# Patient Record
Sex: FEMALE | Race: WHITE | HISPANIC OR LATINO | Employment: OTHER | URBAN - METROPOLITAN AREA
[De-identification: names, ages, dates, MRNs, and addresses within clinical notes are randomized per-mention and may not be internally consistent; named-entity substitution may affect disease eponyms.]

---

## 2018-12-18 ENCOUNTER — OFFICE VISIT (OUTPATIENT)
Dept: FAMILY MEDICINE | Facility: HOSPITAL | Age: 23
End: 2018-12-18
Attending: SPECIALIST
Payer: MEDICAID

## 2018-12-18 ENCOUNTER — DOCUMENTATION ONLY (OUTPATIENT)
Dept: FAMILY MEDICINE | Facility: HOSPITAL | Age: 23
End: 2018-12-18

## 2018-12-18 VITALS
DIASTOLIC BLOOD PRESSURE: 59 MMHG | SYSTOLIC BLOOD PRESSURE: 111 MMHG | HEIGHT: 62 IN | HEART RATE: 79 BPM | WEIGHT: 150.38 LBS | BODY MASS INDEX: 27.67 KG/M2

## 2018-12-18 DIAGNOSIS — Z34.00 ENCOUNTER FOR SUPERVISION PREGNANCY IN PRIMIGRAVIDA, ANTEPARTUM: ICD-10-CM

## 2018-12-18 DIAGNOSIS — Z23 NEED FOR PROPHYLACTIC VACCINATION AND INOCULATION AGAINST INFLUENZA: Primary | ICD-10-CM

## 2018-12-18 LAB
BILIRUB SERPL-MCNC: NEGATIVE MG/DL
BLOOD URINE, POC: NEGATIVE
COLOR, POC UA: NORMAL
GLUCOSE UR QL STRIP: NORMAL
KETONES UR QL STRIP: NEGATIVE
LEUKOCYTE ESTERASE URINE, POC: NEGATIVE
NITRITE, POC UA: NEGATIVE
PH, POC UA: 6
PROTEIN, POC: NEGATIVE
SPECIFIC GRAVITY, POC UA: 1.02
UROBILINOGEN, POC UA: NORMAL

## 2018-12-18 PROCEDURE — 81001 URINALYSIS AUTO W/SCOPE: CPT | Performed by: STUDENT IN AN ORGANIZED HEALTH CARE EDUCATION/TRAINING PROGRAM

## 2018-12-18 PROCEDURE — 99203 OFFICE O/P NEW LOW 30 MIN: CPT | Mod: 25 | Performed by: STUDENT IN AN ORGANIZED HEALTH CARE EDUCATION/TRAINING PROGRAM

## 2018-12-18 PROCEDURE — 90686 IIV4 VACC NO PRSV 0.5 ML IM: CPT

## 2018-12-18 RX ORDER — PROMETHAZINE HYDROCHLORIDE 25 MG/1
SUPPOSITORY RECTAL
Refills: 2 | COMMUNITY
Start: 2018-10-25 | End: 2019-04-03

## 2018-12-18 RX ORDER — PNV NO.95/FERROUS FUM/FOLIC AC 28MG-0.8MG
1 TABLET ORAL DAILY
Refills: 0 | COMMUNITY
Start: 2018-10-22 | End: 2019-04-17 | Stop reason: SDUPTHER

## 2018-12-18 RX ORDER — PROMETHAZINE HYDROCHLORIDE 25 MG/1
TABLET ORAL
Refills: 0 | COMMUNITY
Start: 2018-10-22 | End: 2019-03-30

## 2018-12-18 NOTE — PROGRESS NOTES
Subjective:       Patient ID: IAIN GERONIMO is a 23 y.o. female.    Chief Complaint:  Routine Prenatal Visit      History of Present Illness  23 year old  presents to clinic to establish prenatal care at 15 weeks 4 days by last menstrual period. Her PATY is . She went to [facility], but states she did not like the care she received there and would like to follow in our clinic. Her first pregnancy was uncomplicated with good prenatal care. She was 17 years old during that pregnancy and delivered a full term baby with a weight of about 8 lbs via . Her current pregnancy has been complicated by first trimester nausea and vomiting that has since resolved. Otherwise her pregnancy has been uncomplicated. She denies bleeding or discharge vaginally. She has not felt the baby yet, and has no other complaints at this time.     No Past Medical History  No Past Surgical History  No known Allergies  Currently takes Prenatal vitamins daily     GYN & OB History  Patient's last menstrual period was 2018 (exact date).   Date of Last Pap: No result found    OB History    Para Term  AB Living   2             SAB TAB Ectopic Multiple Live Births                  # Outcome Date GA Lbr Sharif/2nd Weight Sex Delivery Anes PTL Lv   1   40W  8 lbs  M              Review of Systems  Review of Systems   Constitutional: Negative for activity change, appetite change, chills, diaphoresis, fatigue, fever and unexpected weight change.   HENT: Negative for nasal congestion.    Eyes: Negative for discharge and visual disturbance.   Respiratory: Negative for cough, shortness of breath and wheezing.    Cardiovascular: Negative for chest pain and palpitations.   Gastrointestinal: Positive for bloating and nausea. Negative for abdominal pain, blood in stool, constipation, diarrhea, vomiting, reflux and fecal incontinence.   Genitourinary: Negative for bladder incontinence, dyspareunia,  flank pain, frequency, genital sores, hematuria, pelvic pain, urgency, vaginal bleeding, vaginal discharge, dysmenorrhea and vaginal odor.   Musculoskeletal: Negative for back pain, joint swelling and myalgias.   Neurological: Negative for headaches.   Psychiatric/Behavioral: Negative for depression. The patient is not nervous/anxious.            Objective:  Vitals:    12/18/18 1036   BP: (!) 111/59   Pulse: 79   Fetal heart rate: 144      Physical Exam:   Constitutional: She is oriented to person, place, and time. She appears well-developed and well-nourished. No distress.    HENT:   Head: Normocephalic and atraumatic.   Nose: Nose normal. No epistaxis.   Mouth/Throat: Oropharynx is clear and moist. No oropharyngeal exudate.    Eyes: Conjunctivae and EOM are normal. Pupils are equal, round, and reactive to light.    Neck: Normal range of motion. Neck supple. No thyromegaly present.    Cardiovascular: Normal rate, regular rhythm and normal heart sounds.    No murmur heard. Pulse Score: 2+   Pulmonary/Chest: Effort normal and breath sounds normal. No respiratory distress. She has no wheezes. She has no rales. She exhibits no tenderness.        Abdominal: Soft. Bowel sounds are normal. She exhibits no distension. There is no tenderness.   Uterus is 5 cm below umbilicus                  Neurological: She is alert and oriented to person, place, and time.    Skin: Skin is warm and dry. No rash noted. She is not diaphoretic.    Psychiatric: She has a normal mood and affect. Her behavior is normal.          Assessment:        1. Need for prophylactic vaccination and inoculation against influenza    2. Encounter for supervision pregnancy in primigravida, antepartum               Plan:      Routine Prenatal care:  -Continue routine prenatal care  -Urinalysis and urine protein results pending  -Fetal heart tones are within normal limits   -Faxed release of information to [facility] for medical records  -Will  follow up with prenatal labs  -Counseled patient on eating a health diet and staying hydrated   -If she has any further concerns please call the office    -Return to Clinic in 1 month for routine check up and fetal ultrasound

## 2019-02-06 ENCOUNTER — ROUTINE PRENATAL (OUTPATIENT)
Dept: FAMILY MEDICINE | Facility: HOSPITAL | Age: 24
End: 2019-02-06
Attending: FAMILY MEDICINE
Payer: MEDICAID

## 2019-02-06 VITALS
HEART RATE: 99 BPM | DIASTOLIC BLOOD PRESSURE: 71 MMHG | WEIGHT: 157.88 LBS | SYSTOLIC BLOOD PRESSURE: 117 MMHG | BODY MASS INDEX: 28.87 KG/M2

## 2019-02-06 DIAGNOSIS — Z34.90 INTRAUTERINE PREGNANCY: Primary | ICD-10-CM

## 2019-02-06 DIAGNOSIS — Z3A.22 22 WEEKS GESTATION OF PREGNANCY: ICD-10-CM

## 2019-02-06 PROCEDURE — 99213 OFFICE O/P EST LOW 20 MIN: CPT | Performed by: STUDENT IN AN ORGANIZED HEALTH CARE EDUCATION/TRAINING PROGRAM

## 2019-02-07 ENCOUNTER — LAB VISIT (OUTPATIENT)
Dept: LAB | Facility: HOSPITAL | Age: 24
End: 2019-02-07
Attending: STUDENT IN AN ORGANIZED HEALTH CARE EDUCATION/TRAINING PROGRAM
Payer: MEDICAID

## 2019-02-07 DIAGNOSIS — Z3A.22 PREGNANCY WITH 22 COMPLETED WEEKS GESTATION: ICD-10-CM

## 2019-02-07 LAB
BACTERIA #/AREA URNS HPF: ABNORMAL /HPF
BILIRUB UR QL STRIP: NEGATIVE
CLARITY UR: CLEAR
COLOR UR: YELLOW
GLUCOSE UR QL STRIP: NEGATIVE
HGB UR QL STRIP: NEGATIVE
KETONES UR QL STRIP: NEGATIVE
LEUKOCYTE ESTERASE UR QL STRIP: ABNORMAL
MICROSCOPIC COMMENT: ABNORMAL
NITRITE UR QL STRIP: NEGATIVE
PH UR STRIP: 6 [PH] (ref 5–8)
PROT UR QL STRIP: NEGATIVE
SP GR UR STRIP: 1.01 (ref 1–1.03)
SQUAMOUS #/AREA URNS HPF: 2 /HPF
URN SPEC COLLECT METH UR: ABNORMAL
UROBILINOGEN UR STRIP-ACNC: NEGATIVE EU/DL
WBC #/AREA URNS HPF: 6 /HPF (ref 0–5)

## 2019-02-07 PROCEDURE — 87086 URINE CULTURE/COLONY COUNT: CPT

## 2019-02-07 PROCEDURE — 81000 URINALYSIS NONAUTO W/SCOPE: CPT

## 2019-02-07 NOTE — PROGRESS NOTES
IAIN GERONIMO  presents for her 22 week OB visit.  Patient's last menstrual period was 2018 (exact date). She denies any vaginal bleeding and/or discharge. She denies any edema or elevated blood pressures.    No past medical history on file.  No past surgical history on file.  No family history on file.  Review of patient's allergies indicates:  No Known Allergies  Social History     Socioeconomic History    Marital status: Single     Spouse name: Not on file    Number of children: Not on file    Years of education: Not on file    Highest education level: Not on file   Social Needs    Financial resource strain: Not on file    Food insecurity - worry: Not on file    Food insecurity - inability: Not on file    Transportation needs - medical: Not on file    Transportation needs - non-medical: Not on file   Occupational History    Not on file   Tobacco Use    Smoking status: Never Smoker   Substance and Sexual Activity    Alcohol use: No     Frequency: Never    Drug use: Not on file    Sexual activity: Not on file   Other Topics Concern    Not on file   Social History Narrative    Not on file       ROS:  GENERAL: No fever, chills, fatigability or weight loss.  VULVAR: No pain, no lesions and no itching.  VAGINAL: No relaxation, no itching, no discharge, no abnormal bleeding and no lesions.  ABDOMEN: No abdominal pain. Denies nausea. Denies vomiting. No diarrhea. No constipation  BREAST: Denies pain. No lumps. No discharge.  URINARY: No incontinence, no nocturia, no frequency and no dysuria.  CARDIOVASCULAR: No chest pain. No shortness of breath. No leg cramps.  NEUROLOGICAL: no headaches. No vision changes.      Vitals:    19 1636   BP: 117/71   Pulse: 99     GENERAL: healthy  CV: RRR  Pulm: CTAB  Fetal Heart tones: 154.     A/P:    IAIN was seen today for routine prenatal visit.     She endorsed that she would obtain her Pap results along with GC results from [facility]; however, if our  team does not obtain this information then she may need a repeat pap w/ STI testing.     Pregnancy with IUP and currently at 22 completed weeks gestation  -     Urinalysis; Future  -     Urine culture; Future  -     Type & Screen, Labor & Delivery; Future  -     Glucose tolerance, 3 hours; Future at 26 weeks gestation  -     CBC auto differential; Future  -     Hepatitis B surface antigen; Future  -     TREPONEMA PALLIDUM (SYPHILLIS) ANTIBODY; Future  -     Rubella antibody, IgG; Future  -     HIV 1/2 Ag/Ab (4th Gen); Future  -     US OB More Than 14 Wks with Biophysical; Future        Counseled to avoid cat litter, not garden without gloves, avoid raw meat, heat up deli meat, to eat large fish like tuna no more than once a week, and to avoid soft unpasteurized cheeses.  I recommend a PNV daily.  She should avoid ibuprofen.

## 2019-02-08 ENCOUNTER — HOSPITAL ENCOUNTER (OUTPATIENT)
Dept: RADIOLOGY | Facility: HOSPITAL | Age: 24
Discharge: HOME | End: 2019-02-08
Attending: STUDENT IN AN ORGANIZED HEALTH CARE EDUCATION/TRAINING PROGRAM
Payer: MEDICAID

## 2019-02-08 DIAGNOSIS — Z3A.22 PREGNANCY WITH 22 COMPLETED WEEKS GESTATION: ICD-10-CM

## 2019-02-08 LAB — BACTERIA UR CULT: NORMAL

## 2019-02-08 PROCEDURE — 76805 OB US >/= 14 WKS SNGL FETUS: CPT | Mod: TC

## 2019-02-08 PROCEDURE — 76805 OB US >/= 14 WKS SNGL FETUS: CPT | Mod: 26,,, | Performed by: RADIOLOGY

## 2019-02-08 PROCEDURE — 76805 US OB GREATER THAN 14 WEEKS FIRST GESTATION: ICD-10-PCS | Mod: 26,,, | Performed by: RADIOLOGY

## 2019-02-18 ENCOUNTER — DOCUMENTATION ONLY (OUTPATIENT)
Dept: FAMILY MEDICINE | Facility: HOSPITAL | Age: 24
End: 2019-02-18

## 2019-02-18 DIAGNOSIS — Z34.92 PREGNANT AND NOT YET DELIVERED IN SECOND TRIMESTER: Primary | ICD-10-CM

## 2019-02-18 NOTE — PROGRESS NOTES
I called and updated the patient with her results. In addition, I informed her that I placed an order for FTA-ABS since her initial test was equivocal. I encouraged her to maintain her appointment with Dr. Lewis on 3/8/19. I encouraged her to call the clinic with any additional questions or concerns.

## 2019-02-19 ENCOUNTER — LAB VISIT (OUTPATIENT)
Dept: LAB | Facility: HOSPITAL | Age: 24
End: 2019-02-19
Attending: STUDENT IN AN ORGANIZED HEALTH CARE EDUCATION/TRAINING PROGRAM
Payer: MEDICAID

## 2019-02-19 DIAGNOSIS — Z34.92 PREGNANT AND NOT YET DELIVERED IN SECOND TRIMESTER: ICD-10-CM

## 2019-02-19 PROCEDURE — 86780 TREPONEMA PALLIDUM: CPT

## 2019-02-19 PROCEDURE — 36415 COLL VENOUS BLD VENIPUNCTURE: CPT

## 2019-02-20 LAB — T PALLIDUM AB SER QL IF: NORMAL

## 2019-03-08 ENCOUNTER — ROUTINE PRENATAL (OUTPATIENT)
Dept: FAMILY MEDICINE | Facility: HOSPITAL | Age: 24
End: 2019-03-08
Attending: FAMILY MEDICINE
Payer: MEDICAID

## 2019-03-08 VITALS
DIASTOLIC BLOOD PRESSURE: 62 MMHG | WEIGHT: 161.19 LBS | BODY MASS INDEX: 29.48 KG/M2 | SYSTOLIC BLOOD PRESSURE: 107 MMHG | HEART RATE: 84 BPM

## 2019-03-08 DIAGNOSIS — Z34.92 PRENATAL CARE IN SECOND TRIMESTER: Primary | ICD-10-CM

## 2019-03-08 PROCEDURE — 99213 OFFICE O/P EST LOW 20 MIN: CPT | Performed by: STUDENT IN AN ORGANIZED HEALTH CARE EDUCATION/TRAINING PROGRAM

## 2019-03-08 PROCEDURE — 81001 URINALYSIS AUTO W/SCOPE: CPT | Performed by: STUDENT IN AN ORGANIZED HEALTH CARE EDUCATION/TRAINING PROGRAM

## 2019-03-08 NOTE — PROGRESS NOTES
Subjective:       Patient ID: MICHEAL GERONIMO is a 23 y.o. female.    Chief Complaint:  Routine Prenatal Visit      History of Present Illness  Micheal is a 22 yo female, , 26 wks consistent with LMP and first trimester ultrasound. Patient is doing well, +fetal movement, no LOF or vaginal bleeding, and no complaints of dysuria.  Fundal height consistent with GA.  Fetal tones reassuring, 144 bpm.   Patient inquired about feeling diffuse pelvic pressure and vaginal heaviness.    Last visit showed +4 leukocytes on UA, f/u UA today negative for infection.    At last visit, pt slightly anemia at 11.4/35.3 (Hgb/Hct), repeat blood today.    Ordered RPR for syphilis today.       GYN & OB History  Patient's last menstrual period was 2018 (exact date).   Date of Last Pap: No result found    OB History    Para Term  AB Living   1             SAB TAB Ectopic Multiple Live Births                  # Outcome Date GA Lbr Sharif/2nd Weight Sex Delivery Anes PTL Lv   1 Current                   Review of Systems  Review of Systems   Constitutional: Negative.  Negative for appetite change, chills, fatigue and fever.   HENT: Negative for nasal congestion, mouth sores and tinnitus.         Complains of dryness due to colder weather, leading to minor nose bleeding.   Eyes: Negative.  Negative for visual disturbance.   Respiratory: Negative.  Negative for cough, shortness of breath and wheezing.    Cardiovascular: Negative.  Negative for chest pain, palpitations and leg swelling.   Gastrointestinal: Negative.  Negative for abdominal pain, bloating, blood in stool, constipation, diarrhea, nausea, vomiting, reflux and fecal incontinence.   Endocrine: Negative for diabetes and hair loss.   Genitourinary: Negative.  Negative for bladder incontinence, vaginal bleeding and urinary incontinence.   Musculoskeletal: Negative.  Negative for back pain and joint swelling.   Integumentary:  Negative for rash. Negative.    Neurological: Negative.  Negative for numbness and headaches.   Hematological: Does not bruise/bleed easily.   Psychiatric/Behavioral: Negative for depression. The patient is not nervous/anxious.            Objective:    Physical Exam:   Constitutional: She appears well-developed and well-nourished. No distress.    HENT:   Head: Normocephalic.   Right Ear: External ear normal.   Left Ear: External ear normal.   Nose: Nose normal.   Mouth/Throat: Oropharynx is clear and moist. No oropharyngeal exudate.    Eyes: Conjunctivae and EOM are normal. Pupils are equal, round, and reactive to light. Right eye exhibits no discharge. Left eye exhibits no discharge. No scleral icterus.    Neck: Normal range of motion. Neck supple. No thyromegaly present.    Cardiovascular: Normal rate, regular rhythm and normal heart sounds.  Exam reveals no edema.     Pulmonary/Chest: Effort normal and breath sounds normal.        Abdominal: Soft.             Musculoskeletal: Normal range of motion.      Lymphadenopathy:     She has no cervical adenopathy.    Neurological: She is alert.    Skin: Skin is warm and dry. No pallor.    Psychiatric: She has a normal mood and affect. Her behavior is normal.          Assessment:        1. Prenatal care in second trimester               Plan:      Blood work: RPR   Urinalysis: negative for infection, leukocytes or bacteria   Fetal heart tones within normal limits   Prenatal labs completed and within normal limits, no gestational diabetes  Fundal height appropriate for gestational age  Continue routine prenatal care  Fetal anatomy scan within normal limits, healthy pregnancy   Encouraged to continue taking daily multivitamin  Stay hydrated  Return to clinic in 4 weeks for follow up

## 2019-03-30 ENCOUNTER — HOSPITAL ENCOUNTER (EMERGENCY)
Facility: HOSPITAL | Age: 24
Discharge: HOME | End: 2019-03-30
Attending: EMERGENCY MEDICINE
Payer: MEDICAID

## 2019-03-30 VITALS
HEART RATE: 90 BPM | SYSTOLIC BLOOD PRESSURE: 108 MMHG | HEIGHT: 62 IN | TEMPERATURE: 98 F | BODY MASS INDEX: 30 KG/M2 | DIASTOLIC BLOOD PRESSURE: 61 MMHG | OXYGEN SATURATION: 99 % | WEIGHT: 163 LBS | RESPIRATION RATE: 16 BRPM

## 2019-03-30 DIAGNOSIS — Z3A.30 30 WEEKS GESTATION OF PREGNANCY: Primary | ICD-10-CM
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
## 2019-03-30 DIAGNOSIS — S90.32XA CONTUSION OF LEFT FOOT, INITIAL ENCOUNTER: ICD-10-CM

## 2019-03-30 PROCEDURE — 99284 EMERGENCY DEPT VISIT MOD MDM: CPT

## 2019-03-30 NOTE — ED NOTES
Patient presents to ED with c/o Fall. Patient states she fell at home about an hour ago. States she has some mild lower back pain and left foot pain 4/10. Patient is 30 weeks pregnant. She denies hitting her abdomen.     APPEARANCE: Alert, oriented and in no acute distress.  CARDIAC: Normal rate and rhythm, no murmur heard.   PERIPHERAL VASCULAR: peripheral pulses present. Normal cap refill. No edema. Warm to touch.    RESPIRATORY:Normal rate and effort, breath sounds clear bilaterally throughout chest. Respirations are equal and unlabored no obvious signs of distress.  GASTRO: bowel sounds normal, 30 weeks pregnant..  MUSC: Full ROM. No bony tenderness or soft tissue tenderness. No obvious deformity.  SKIN: Skin is warm and dry, normal skin turgor, mucous membranes moist.  NEURO: 5/5 strength major flexors/extensors bilaterally. Sensory intact to light touch bilaterally. Estrella coma scale: eyes open spontaneously-4, oriented & converses-5, obeys commands-6. No neurological abnormalities.   MENTAL STATUS: awake, alert and aware of environment.  EYE: PERRL, both eyes: pupils brisk and reactive to light. Normal size.  ENT: EARS: no obvious drainage. NOSE: no active bleeding.

## 2019-03-30 NOTE — ED PROVIDER NOTES
Encounter Date: 3/30/2019    SCRIBE #1 NOTE: I, Malissa Hammer, am scribing for, and in the presence of,  Dr. Ramos. I have scribed the entire note.       History     Chief Complaint   Patient presents with    Fall     Fell doen a flight of stairs and complaints of lower back pain and left top of foot.  No vaginal bleeding, and no abdominal pain.  No LOC.  Patient is 30 weeks pregnant.      IAIN GERONIMO is a 23 y.o. female who  has no past medical history on file.    The patient is 30 weeks pregnat and presents to the ED due to lower back and left foot pain secondary to a fall. Patient states that she fell down 5 stairs and landed on her back. Patient denies hitting her head or any LOC. No abdominal pain or vaginal bleeding.                The history is provided by the patient.     Review of patient's allergies indicates:  No Known Allergies  History reviewed. No pertinent past medical history.  History reviewed. No pertinent surgical history.  History reviewed. No pertinent family history.  Social History     Tobacco Use    Smoking status: Never Smoker   Substance Use Topics    Alcohol use: No     Frequency: Never    Drug use: Not on file     Review of Systems   Gastrointestinal: Negative for abdominal pain.   Genitourinary: Negative for vaginal bleeding.   Musculoskeletal: Positive for arthralgias (left foot) and back pain (lower back pain).   All other systems reviewed and are negative.      Physical Exam     Initial Vitals [03/30/19 1007]   BP Pulse Resp Temp SpO2   108/61 90 16 98 °F (36.7 °C) 99 %      MAP       --         Physical Exam    Nursing note and vitals reviewed.  Constitutional: She appears well-developed and well-nourished. She is not diaphoretic. No distress.   HENT:   Head: Normocephalic and atraumatic.   Mouth/Throat: Oropharynx is clear and moist.   Eyes: Conjunctivae and EOM are normal. Pupils are equal, round, and reactive to light.   Neck: Normal range of motion. Neck supple.    Cardiovascular: Normal rate, regular rhythm and normal heart sounds. Exam reveals no gallop and no friction rub.    No murmur heard.  Pulmonary/Chest: Breath sounds normal. She has no wheezes. She has no rhonchi. She has no rales.   Abdominal: Soft. Bowel sounds are normal. There is no tenderness. There is no rebound and no guarding.   Gravid.  No tenderness.    Musculoskeletal: Normal range of motion. She exhibits tenderness. She exhibits no edema.   No cervical vertebral tenderness.  Mild tenderness of lower lumbar vertebrate.  Top of left foot with tenderness over the mid first metatarsal.  No swelling or bruising.  Remainder of extremity exam unremarkable.    Lymphadenopathy:     She has no cervical adenopathy.   Neurological: She is alert and oriented to person, place, and time. She has normal strength.   Skin: Skin is warm and dry. Capillary refill takes less than 2 seconds. No rash noted.         ED Course   Procedures  Labs Reviewed - No data to display       Imaging Results          X-Ray Foot Complete Left (Final result)  Result time 03/30/19 10:55:09    Final result by Guzman Santos MD (03/30/19 10:55:09)                 Impression:      As above.      Electronically signed by: Guzman Santos MD  Date:    03/30/2019  Time:    10:55             Narrative:    EXAMINATION:  XR FOOT COMPLETE 3 VIEW LEFT    CLINICAL HISTORY:  .  Unspecified fall, initial encounter    TECHNIQUE:  AP, lateral and oblique views of the left foot were performed.    COMPARISON:  None    FINDINGS:  No fracture or dislocation.  Lisfranc articulation is congruent.  Cartilage spaces are maintained.  Soft tissues are unremarkable.                                 Medical Decision Making:   Clinical Tests:   Radiological Study: Ordered and Reviewed  ED Management:  23-year-old female who is 30 weeks pregnant and fell down stairs this morning.  Patient complained of lower back pain left foot pain.  She has had no abdominal pain or  vaginal bleeding.  Fetal heart tones were obtained at 1:46 a.m..  Abdomen was shielded and a left foot x-ray was done showing no fracture or dislocation.  I do not suspect placental abruption.  Findings were discussed with the [facility] resident, (she is a patient of theirs).  She will follow up in clinic as soon as able for recheck but also return here immediately for any abdominal pain or vaginal bleeding.                      Clinical Impression:     1. 30 weeks gestation of pregnancy    2. Fall    3. Contusion of left foot, initial encounter              I, Dr. Jose Ramos, personally performed the services described in this documentation. All medical record entries made by the scribe were at my direction and in my presence. I have reviewed the chart and agree that the record reflects my personal performance and is accurate and complete. Jose Ramos MD.  11:52 AM 03/30/2019                      Jose Ramos MD  03/30/19 1830

## 2019-04-03 ENCOUNTER — ROUTINE PRENATAL (OUTPATIENT)
Dept: FAMILY MEDICINE | Facility: HOSPITAL | Age: 24
End: 2019-04-03
Attending: FAMILY MEDICINE
Payer: MEDICAID

## 2019-04-03 VITALS
DIASTOLIC BLOOD PRESSURE: 65 MMHG | SYSTOLIC BLOOD PRESSURE: 112 MMHG | WEIGHT: 169.31 LBS | BODY MASS INDEX: 30.97 KG/M2 | HEART RATE: 93 BPM

## 2019-04-03 DIAGNOSIS — N30.00 ACUTE CYSTITIS WITHOUT HEMATURIA: ICD-10-CM

## 2019-04-03 DIAGNOSIS — Z3A.30 30 WEEKS GESTATION OF PREGNANCY: Primary | ICD-10-CM

## 2019-04-03 PROCEDURE — 99213 OFFICE O/P EST LOW 20 MIN: CPT | Performed by: STUDENT IN AN ORGANIZED HEALTH CARE EDUCATION/TRAINING PROGRAM

## 2019-04-03 NOTE — PROGRESS NOTES
Subjective:       Patient ID: IAIN GERONIMO is a 23 y.o. female.    Chief Complaint:  Routine Prenatal Visit      History of Present Illness  Ms. Geronimo is a 23 year old  female at 30 weeks gestation who presents today for routine prenatal visit. She reports a recent fall that occurred about 4 days ago. She fell down about 5 steps and landed on her back so she presented to the ED. Denies trauma to her belly. She experienced low back and left foot pain at the time, which has since resolved. Denies any belly pain or vaginal bleeding. Reports feeling safe at home. Continues to feel baby move and denies decreased movement. Fundal height measures 31 cm. Fetal heart tones detected around 150 bpm.       GYN & OB History  Patient's last menstrual period was 2018 (exact date).   Date of Last Pap: No result found    OB History    Para Term  AB Living   1             SAB TAB Ectopic Multiple Live Births                  # Outcome Date GA Lbr Sharif/2nd Weight Sex Delivery Anes PTL Lv   1 Current                Review of Systems  Review of Systems   Constitutional: Negative.  Negative for activity change, appetite change, fatigue and fever.   HENT: Negative.  Negative for nasal congestion.    Eyes: Negative.  Negative for visual disturbance.   Respiratory: Negative.  Negative for cough, shortness of breath and wheezing.    Cardiovascular: Negative.  Negative for chest pain and leg swelling.   Gastrointestinal: Negative.  Negative for abdominal pain, bloating, blood in stool, constipation, diarrhea, nausea and vomiting.   Endocrine: Negative.  Negative for diabetes and hair loss.   Genitourinary: Negative.  Negative for bladder incontinence, dyspareunia, dysuria, flank pain, hematuria, pelvic pain, urgency and vaginal bleeding.   Musculoskeletal: Positive for back pain. Negative for arthralgias, joint swelling, leg pain and myalgias.   Integumentary:  Negative for rash, breast skin changes and  breast tenderness. Negative.   Neurological: Negative.  Negative for headaches.   Hematological: Negative.  Does not bruise/bleed easily.   Psychiatric/Behavioral: Negative.  Negative for depression. The patient is not nervous/anxious.    Breast: Negative for skin changes and tenderness          Objective:    Physical Exam:   Constitutional: She is oriented to person, place, and time. She appears well-developed and well-nourished. No distress.    HENT:   Head: Normocephalic and atraumatic.   Right Ear: External ear normal.   Left Ear: External ear normal.   Nose: No epistaxis.   Mouth/Throat: Oropharynx is clear and moist.    Eyes: Pupils are equal, round, and reactive to light. Conjunctivae and EOM are normal.    Neck: Normal range of motion. Neck supple.    Cardiovascular: Normal rate, regular rhythm, normal heart sounds and intact distal pulses.    No murmur heard. Pulse Score: 2+   Pulmonary/Chest: Effort normal and breath sounds normal.        Abdominal: Soft. Bowel sounds are normal. She exhibits distension (gravid). She exhibits no abdominal incision. There is no tenderness.             Musculoskeletal: Normal range of motion and moves all extremeties.       Neurological: She is alert and oriented to person, place, and time.    Skin: Skin is warm and dry. She is not diaphoretic.    Psychiatric: She has a normal mood and affect. Her behavior is normal. Judgment and thought content normal.          Assessment:        1. 30 weeks gestation of pregnancy               Plan:       Routine prenatal care :  Urinalysis: 2+ leukocytes   Will send for urine culture   Fundal height appropriate for gestational age   Continue routine prenatal care   She desires tubaligation after birth   Counseled patient on options for birth control, but she reaffirmed the desire to have permanent sterilization after delivery     Will perform 3rd trimester blood   RTC in 2 weeks

## 2019-04-10 NOTE — PROGRESS NOTES
I have reviewed the notes, assessments, and/or procedures performed, I concur with her/his documentation of IAIN GERONIMO.

## 2019-04-17 ENCOUNTER — ROUTINE PRENATAL (OUTPATIENT)
Dept: FAMILY MEDICINE | Facility: HOSPITAL | Age: 24
End: 2019-04-17
Payer: MEDICAID

## 2019-04-17 VITALS
SYSTOLIC BLOOD PRESSURE: 112 MMHG | DIASTOLIC BLOOD PRESSURE: 71 MMHG | WEIGHT: 170.63 LBS | BODY MASS INDEX: 31.21 KG/M2

## 2019-04-17 DIAGNOSIS — Z34.93 PRENATAL CARE IN THIRD TRIMESTER: Primary | ICD-10-CM

## 2019-04-17 DIAGNOSIS — Z34.90 INTRAUTERINE PREGNANCY: ICD-10-CM

## 2019-04-17 DIAGNOSIS — Z34.93 PRENATAL CARE IN THIRD TRIMESTER: ICD-10-CM

## 2019-04-17 DIAGNOSIS — Z3A.32 32 WEEKS GESTATION OF PREGNANCY: Primary | ICD-10-CM

## 2019-04-17 LAB
BILIRUB SERPL-MCNC: ABNORMAL MG/DL
BLOOD URINE, POC: ABNORMAL
COLOR, POC UA: YELLOW
GLUCOSE UR QL STRIP: ABNORMAL
KETONES UR QL STRIP: ABNORMAL
LEUKOCYTE ESTERASE URINE, POC: ABNORMAL
NITRITE, POC UA: ABNORMAL
PH, POC UA: 6
PROTEIN, POC: ABNORMAL
SPECIFIC GRAVITY, POC UA: 1.02
UROBILINOGEN, POC UA: ABNORMAL

## 2019-04-17 PROCEDURE — 90471 IMMUNIZATION ADMIN: CPT

## 2019-04-17 PROCEDURE — 99213 OFFICE O/P EST LOW 20 MIN: CPT | Mod: 25 | Performed by: STUDENT IN AN ORGANIZED HEALTH CARE EDUCATION/TRAINING PROGRAM

## 2019-04-17 RX ORDER — PNV NO.95/FERROUS FUM/FOLIC AC 28MG-0.8MG
1 TABLET ORAL DAILY
Qty: 90 TABLET | Refills: 4 | COMMUNITY
Start: 2019-04-17 | End: 2019-05-02 | Stop reason: SDUPTHER

## 2019-04-17 NOTE — PROGRESS NOTES
Subjective:       Patient ID: IAIN GERONIMO is a 23 y.o. female.    Chief Complaint:  Pregnancy       History of Present Illness  Ms. Burton is a 23 year old  at 32 w 5d presents for routine prenatal care. She reports good fetal movement, no vaginal bleeding, no loss of fluid, she occasionally feels false labor pains but not-painful, infrequent and goes away quickly. She wears a seatbelt every time getting into a car, and wears it low across her lap. She lives at home with  and son, denies history of physical or sexual abuse and feel safe at home.     Prenatal labs:     Type and Screen: O+/-  CBC: 11.7>11.4/35.3<260  Pap: NILM HPV negative  T spot: negative  Rubella: Immune   RPR: Nonreactive  HIV; Negative   GC/CT: negative   Glucose screen: 110     Otherwise ROS negative    GYN & OB History  Patient's last menstrual period was 2018 (exact date).   Date of Last Pap: No result found    OB History    Para Term  AB Living   1             SAB TAB Ectopic Multiple Live Births                  # Outcome Date GA Lbr Sharif/2nd Weight Sex Delivery Anes PTL Lv   1 Current                Review of Systems  Review of Systems   Eyes: Negative for visual disturbance.   Respiratory: Negative for cough, shortness of breath and wheezing.    Cardiovascular: Negative for chest pain, palpitations and leg swelling.   Endocrine: Negative for diabetes.   Genitourinary: Negative for bladder incontinence, flank pain, hematuria, urgency, vaginal bleeding, vaginal discharge, postcoital bleeding and vaginal dryness.   Musculoskeletal: Negative for back pain.   Neurological: Negative for headaches.   Psychiatric/Behavioral: Negative for depression. The patient is not nervous/anxious.            Objective:    Physical Exam:   Constitutional: She appears well-developed and well-nourished.    HENT:   Head: Normocephalic and atraumatic.    Eyes: Pupils are equal, round, and reactive to light.    Neck: Normal  range of motion. Neck supple.    Cardiovascular: Normal rate, regular rhythm and normal heart sounds.   Pulse Score: 2+   Pulmonary/Chest: Effort normal and breath sounds normal. No respiratory distress. She has no wheezes.        Abdominal:   Gravid uterus   Fundus measured at 33 cm                Musculoskeletal: She exhibits no tenderness.       Neurological: She is alert.    Skin: Skin is warm. No rash noted. No erythema.    Psychiatric: She has a normal mood and affect. Her behavior is normal.        FHT: 144 bpm       Urinalysis  Urine dipstick shows positive for protein.  Micro exam: negative for WBC's or RBC's.    Assessment:        1. 32 weeks gestation of pregnancy    2. Intrauterine pregnancy    3. Prenatal care in third trimester               Plan:      32 weeks GA intrauterine pregnancy   Plans on having vaginal delivery  tdaP at this visit  GBS at 36 WGA   Prenatal labs reviewed and normal   Patient continues to desire tubal after pregnancy, counseled patient all options for long term birthcontrol, but continues to desire intertility  Will refer to Gynecology for BTL after delivery

## 2019-04-17 NOTE — PROGRESS NOTES
Called Micheal Persaud to inform her she needed to have GTT done before her clinic appointment. She will come to lab at 1 pm to have lab test done before her clinic appointment at 2:40.     Milady Lewis  [facility]   PGY-1

## 2019-05-02 ENCOUNTER — ROUTINE PRENATAL (OUTPATIENT)
Dept: FAMILY MEDICINE | Facility: HOSPITAL | Age: 24
End: 2019-05-02
Attending: FAMILY MEDICINE
Payer: MEDICAID

## 2019-05-02 DIAGNOSIS — Z3A.32 32 WEEKS GESTATION OF PREGNANCY: ICD-10-CM

## 2019-05-02 PROCEDURE — 99212 OFFICE O/P EST SF 10 MIN: CPT | Performed by: STUDENT IN AN ORGANIZED HEALTH CARE EDUCATION/TRAINING PROGRAM

## 2019-05-02 RX ORDER — PNV NO.95/FERROUS FUM/FOLIC AC 28MG-0.8MG
1 TABLET ORAL DAILY
Qty: 90 TABLET | Refills: 3 | Status: SHIPPED | OUTPATIENT
Start: 2019-05-02 | End: 2020-05-01

## 2019-05-02 NOTE — PROGRESS NOTES
I assume primary medical responsibility for this patient. I have reviewed the history, physical, and assessement & treatment plan with the resident and agree that the care is reasonable and necessary. This service has been performed by a resident without the presence of a teaching physician under the primary care exception. If necessary, an addendum of additional findings or evaluation beyond the resident documentation will be noted below.    23y  @ 34+5 by L=12wk US (?) here for routine prenatal visit, slightly early GBS performed. Neg pre-E symptoms. +FM, occational ctx, neg LOF, VB. PATY 19 per notes. Plan for tubal    Sherrie Jo MD

## 2019-05-02 NOTE — PROGRESS NOTES
Subjective:     IAIN GERONIMO is a 23 y.o.  at 34 weeks and 5 days by first trimester U/S. Patient reports no bleeding, no cramping, no leaking and occasional contractions. Fetal movement: normal. She reports occasional contractions, some of which are painful. They are inconsistent, and usually go away on it own. She reports drinking a lot of water, no loss of fluid or bleeding. Otherwise she has no complaints. She denies headaches, changes in vision, nausea, vomiting or weakness or dizziness.     She continues to desire infertility after this pregnancy. Consent forms were signed for bilateral tubal. Will attempt to call OB/GYN to perform BTL in the post-partum period.     Patient's medications, allergies, past medical, surgical, social and family histories were reviewed and updated as appropriate.    Prenatal Labs:    Prenatal labs:      Type and Screen: O+/-  CBC: 11.7>11.4/35.3<260  Pap: NILM HPV negative  T spot: negative  Rubella: Immune   RPR: Nonreactive  HIV; Negative   GC/CT: negative   Glucose screen: 110            Review of Systems    Pertinent items are noted in HPI.     Objective:    There were no vitals filed for this visit.    Fetal Heart Tones: 146    Fundal Height: 35 cm    Urine Dipstick: negative     Assessment:      23 y.o.  at 35 weeks gestation consistent with 12 week US     1. 32 weeks gestation of pregnancy  PRENATAL 28 mg iron- 800 mcg Tab            Plan:     Plans for delivery: Vaginal anticipated    Beta strep culture: done    Counseling: Consent signed.  Infant feeding: plans to breastfeed.  L&D discussion: symptoms of labor, rupture of membranes, hospital length of stay, discussed when to call, discussed what number to call, discussed hospital routine and anesthetic/analgesic options reviewed.  Continue prenatal vitamin     Fetal testing: discussed    Postpartum contraception planned: bilateral tubal ligation    Follow up in 2 Weeks.      50% of 20 min visit spent on  counseling and coordination of care.      Case discussed with Dr. Ramos

## 2019-05-09 ENCOUNTER — ROUTINE PRENATAL (OUTPATIENT)
Dept: OBSTETRICS AND GYNECOLOGY | Facility: CLINIC | Age: 24
End: 2019-05-09
Payer: MEDICAID

## 2019-05-09 VITALS — SYSTOLIC BLOOD PRESSURE: 102 MMHG | DIASTOLIC BLOOD PRESSURE: 60 MMHG | BODY MASS INDEX: 32.5 KG/M2 | WEIGHT: 177.69 LBS

## 2019-05-09 DIAGNOSIS — Z3A.36 36 WEEKS GESTATION OF PREGNANCY: ICD-10-CM

## 2019-05-09 DIAGNOSIS — Z34.83 ENCOUNTER FOR SUPERVISION OF OTHER NORMAL PREGNANCY IN THIRD TRIMESTER: Primary | ICD-10-CM

## 2019-05-09 DIAGNOSIS — Z34.93 PRENATAL CARE IN THIRD TRIMESTER: Primary | ICD-10-CM

## 2019-05-09 PROCEDURE — 99203 PR OFFICE/OUTPT VISIT, NEW, LEVL III, 30-44 MIN: ICD-10-PCS | Mod: TH,S$PBB,, | Performed by: OBSTETRICS & GYNECOLOGY

## 2019-05-09 PROCEDURE — 87081 CULTURE SCREEN ONLY: CPT

## 2019-05-09 PROCEDURE — 99203 OFFICE O/P NEW LOW 30 MIN: CPT | Mod: TH,S$PBB,, | Performed by: OBSTETRICS & GYNECOLOGY

## 2019-05-09 PROCEDURE — 99999 PR PBB SHADOW E&M-EST. PATIENT-LVL II: ICD-10-PCS | Mod: PBBFAC,,, | Performed by: OBSTETRICS & GYNECOLOGY

## 2019-05-09 PROCEDURE — 87491 CHLMYD TRACH DNA AMP PROBE: CPT

## 2019-05-09 PROCEDURE — 99212 OFFICE O/P EST SF 10 MIN: CPT | Mod: PBBFAC,TH,PO | Performed by: OBSTETRICS & GYNECOLOGY

## 2019-05-09 PROCEDURE — 99999 PR PBB SHADOW E&M-EST. PATIENT-LVL II: CPT | Mod: PBBFAC,,, | Performed by: OBSTETRICS & GYNECOLOGY

## 2019-05-09 NOTE — PROGRESS NOTES
24 yo  female @ 36 wks by 23.1 wk sono (EDC 2019) referred by [facility] to discuss fertility options. Patient desires to proceed with permanent sterilization.  We have discussed other options for contraception. Patient continues to desire to proceed with sterilization. She would like to have procedure done, if possible, on day of delivery.    Obhx:  G1: term, no complications,   G2: current, no complications, prenatal record reviewed    PMH: none  Surgery: none  Gynhx: no h/o STDs  Meds: PNV  Family hx: HTN  Social: denies t/d/e    PE  /60   Wt 80.6 kg (177 lb 11.1 oz)   LMP 2018 (Exact Date)   BMI 32.50 kg/m²   Gen: A&Ox3, NAD  Abd: gravid, FH 34  SVE: closed  Unofficial US shows fetus in cephalic presentation    AP: 24 yo  female @ 36 wks (EDC 19)  GBS and urine Gc/chl collected  Recommend repeat HIV, RPR, CBC at visit with DASHA SMITH next week  Medicaid tubal form signed and reviewed in her chart (19)  Consents for tubal signed today and will be scanned in her chart.    dudley jiang MD

## 2019-05-10 LAB
C TRACH DNA SPEC QL NAA+PROBE: NOT DETECTED
N GONORRHOEA DNA SPEC QL NAA+PROBE: NOT DETECTED

## 2019-05-13 LAB — BACTERIA SPEC AEROBE CULT: NORMAL

## 2019-05-16 ENCOUNTER — ROUTINE PRENATAL (OUTPATIENT)
Dept: FAMILY MEDICINE | Facility: HOSPITAL | Age: 24
End: 2019-05-16
Attending: FAMILY MEDICINE
Payer: MEDICAID

## 2019-05-16 ENCOUNTER — HOSPITAL ENCOUNTER (OUTPATIENT)
Facility: HOSPITAL | Age: 24
Discharge: HOME | End: 2019-05-17
Attending: OBSTETRICS & GYNECOLOGY | Admitting: OBSTETRICS & GYNECOLOGY
Payer: MEDICAID

## 2019-05-16 VITALS
DIASTOLIC BLOOD PRESSURE: 65 MMHG | HEIGHT: 62 IN | SYSTOLIC BLOOD PRESSURE: 105 MMHG | BODY MASS INDEX: 32.5 KG/M2 | HEART RATE: 92 BPM

## 2019-05-16 DIAGNOSIS — R10.9 ABDOMINAL PAIN AFFECTING PREGNANCY: ICD-10-CM

## 2019-05-16 DIAGNOSIS — O26.899 ABDOMINAL PAIN AFFECTING PREGNANCY: ICD-10-CM

## 2019-05-16 DIAGNOSIS — Z34.93 PRENATAL CARE IN THIRD TRIMESTER: Primary | ICD-10-CM

## 2019-05-16 PROCEDURE — 99212 OFFICE O/P EST SF 10 MIN: CPT | Performed by: STUDENT IN AN ORGANIZED HEALTH CARE EDUCATION/TRAINING PROGRAM

## 2019-05-16 NOTE — PROGRESS NOTES
Subjective:       Patient ID: IAIN GERONIMO is a 23 y.o. female.    Chief Complaint:  OB follow up       History of Present Illness  23 year old  presents to clinic at 37 w 0 d consistent with LMP and 2nd trimester ultrasound with an PATY 19. Doing well. She reports contractions that are painful and last a few minutes, and occurred every 2 to 3 minutes, but went away on its own. She reports no loss of fluid or bleeding. She reports normal fetal movement, otherwise is feeling tired and increased GERD symptoms. Otherwise she is doing well and looking forward to delivery. She denies headaches, changes in vision, nausea, vomiting.       GYN & OB History  Patient's last menstrual period was 2018 (exact date).   Date of Last Pap: No result found    OB History    Para Term  AB Living   2 1 1     1   SAB TAB Ectopic Multiple Live Births           1      # Outcome Date GA Lbr Sharif/2nd Weight Sex Delivery Anes PTL Lv   2 Current            1 Term  40w0d   M Vag-Spont None  KIKI       Review of Systems  Review of Systems   Constitutional: Negative for activity change, appetite change, chills and fever.   HENT: Negative for nasal congestion.    Respiratory: Positive for shortness of breath. Negative for cough and wheezing.    Cardiovascular: Negative for chest pain and palpitations.   Gastrointestinal: Negative for constipation, diarrhea, nausea and vomiting.   Endocrine: Negative for hair loss and hot flashes.   Genitourinary: Negative for bladder incontinence, hematuria, urgency, vaginal bleeding, vaginal pain, urinary incontinence and vaginal odor.   Musculoskeletal: Negative for back pain, leg pain and myalgias.   Integumentary:  Negative for rash, breast mass and nipple discharge.   Neurological: Negative for seizures, numbness and headaches.   Hematological: Negative for adenopathy.   Psychiatric/Behavioral: Negative for depression. The patient is not nervous/anxious.    Breast:  Negative for asymmetry, mass and nipple discharge          Objective:    Physical Exam:   Constitutional: She is oriented to person, place, and time. She appears well-developed and well-nourished. No distress.    HENT:   Head: Normocephalic and atraumatic.   Nose: Nose normal.   Mouth/Throat: Oropharynx is clear and moist. No oropharyngeal exudate.    Eyes: Pupils are equal, round, and reactive to light. EOM are normal.    Neck: Normal range of motion. Neck supple.    Cardiovascular: Normal rate, regular rhythm, normal heart sounds and intact distal pulses.  Exam reveals no gallop and no edema.    No murmur heard. Pulse Score: 2+   Pulmonary/Chest: Effort normal and breath sounds normal. No respiratory distress. She has no wheezes.        Abdominal: Soft. Bowel sounds are normal.   Gravid uterus     Genitourinary: Vagina normal and uterus normal. No vaginal discharge found.   Genitourinary Comments: Cervical Check: 3 cm dilated 50% effaced -2 station            Musculoskeletal: She exhibits no edema or tenderness.      Lymphadenopathy:     She has no cervical adenopathy.    Neurological: She is alert and oriented to person, place, and time. She has normal reflexes. She displays normal reflexes. She exhibits normal muscle tone.    Skin: Skin is warm and dry. No rash noted. She is not diaphoretic. No erythema.    Psychiatric: She has a normal mood and affect. Her behavior is normal.          Assessment:        1. Prenatal care in third trimester             Plan:      23 year old  presents at 37 0/7 wga for routine prenatal care  Doing well, contractions are painful but inconsistent   Counseled to return to hospital if contractions last for more than 1 hour and are between 5 and 10 minutes apart  She desires a natural birth without epidural  Plans to breast and bottle feed   BTL scheduled post delivery with Dr. Marin  Third trimester labs ordered  Continue prenatal vitamins   FU in 5 days

## 2019-05-16 NOTE — PROGRESS NOTES
I assume primary medical responsibility for this patient. I have reviewed the history, physical, and assessement & treatment plan with the resident and agree that the care is reasonable and necessary. This service has been performed by a resident with the presence of a teaching physician under the primary care exception. If necessary, an addendum of additional findings or evaluation beyond the resident documentation will be noted below.     37+0 , GBS positive. Luz w/ cervix at 3cm, counseled regarding signs of labor, importance of presenting early if persistent ctx or LOF.     Sherrie Jo MD

## 2019-05-17 VITALS
SYSTOLIC BLOOD PRESSURE: 104 MMHG | HEART RATE: 86 BPM | TEMPERATURE: 98 F | OXYGEN SATURATION: 98 % | DIASTOLIC BLOOD PRESSURE: 52 MMHG | RESPIRATION RATE: 14 BRPM

## 2019-05-17 PROBLEM — R10.9 ABDOMINAL PAIN AFFECTING PREGNANCY: Status: ACTIVE | Noted: 2019-05-17

## 2019-05-17 PROBLEM — O26.899 ABDOMINAL PAIN AFFECTING PREGNANCY: Status: ACTIVE | Noted: 2019-05-17

## 2019-05-17 PROCEDURE — 59025 FETAL NON-STRESS TEST: CPT

## 2019-05-17 PROCEDURE — 99211 OFF/OP EST MAY X REQ PHY/QHP: CPT | Mod: 25

## 2019-05-17 RX ORDER — ACETAMINOPHEN 500 MG
500 TABLET ORAL EVERY 6 HOURS PRN
Status: DISCONTINUED | OUTPATIENT
Start: 2019-05-17 | End: 2019-05-17 | Stop reason: HOSPADM

## 2019-05-17 RX ORDER — ONDANSETRON 8 MG/1
8 TABLET, ORALLY DISINTEGRATING ORAL EVERY 8 HOURS PRN
Status: DISCONTINUED | OUTPATIENT
Start: 2019-05-17 | End: 2019-05-17 | Stop reason: HOSPADM

## 2019-05-17 NOTE — PROGRESS NOTES
[facility]  Obstetrics  Antepartum Progress Note    Patient Name: IAIN GERONIMO  MRN: [id number]  Admission Date: 2019  Hospital Length of Stay: 0 days  Attending Physician: Byron Dove MD  Primary Care Provider: Milady Lewis MD    Subjective:     Principal Problem: Contractions  HPI:   23 year old  presents to the L&D at 37 w 1 d consistent with LMP and 2nd trimester ultrasound with an PATY 19. She came in with the complaints of painful contractions that started this morning and she feels them every 5-7 mins. She has not had any vaginal discharge, bleeding or loss of fluid. She reports normal fetal movement. She had seen her PCP yesterday for a routine exam.     Objective:     Vital Signs (Most Recent):  Temp: 98 °F (36.7 °C) (19)  Pulse: 82 (19)  Resp: 14 (19)  BP: (!) 95/47 (19)  SpO2: 98 % (19) Vital Signs (24h Range):  Temp:  [98 °F (36.7 °C)] 98 °F (36.7 °C)  Pulse:  [80-94] 82  Resp:  [14] 14  SpO2:  [97 %-99 %] 98 %  BP: ()/(47-66) 95/47        There is no height or weight on file to calculate BMI.    FHT: 140's   TOCO:  Q 5-7 minutes    No intake or output data in the 24 hours ending 19 0232    Physical Exam  Per nurses: Cervix 2  Cards: RRR  Pulm: CTAB  Ext: intact pulses.     Significant Labs:  Recent Lab Results       19  1200        Baso # 0.01     Basophil% 0.1     Differential Method Automated     Eos # 0.2     Eosinophil% 1.4     Gran # (ANC) 8.5     Gran% 75.6     Hematocrit 39.2     Hemoglobin 12.9     Lymph # 1.8     Lymph% 16.1     MCH 27.6     MCHC 32.9     MCV 84     Mono # 0.7     Mono% 6.0     MPV 11.2     Platelets 243     RBC 4.68     RDW 14.1     WBC 11.21           Assessment/Plan:     23 y.o. female  at 37w1d for:    Active Diagnoses:    Diagnosis Date Noted POA    Abdominal pain affecting pregnancy [O26.899, R10.9] 2019 Yes      Problems Resolved During  this Admission:     Pt was triaged with nurses. She was under observation for about 3 hours or more.   Her contractions had spaced out to every 12 mins with no pain.   She did not have any progress in her cervical dilation.   She was given instructions on keeping herself hydrated.   She was also given instructions about active labor and if she sees any of the signs to return to L&D.       Sofi Ramírez MD  Obstetrics  [facility]

## 2019-05-17 NOTE — NURSING
23 year old G2 P 1 at 37 weeks received to triage 1 C/O contractions since 5/15/19. History/complications of clear mucous discharge,. denies vaginal bleeding, denies leaking fluid. Fetus: fetal heart tones audible in 140s, positive fetal movements.Contractions are being monitored by external TOCO. Abdomen pregnant, no abnormalities. Vital signs WNL Cervix: 1/25/-2 .Educated on electronic fetal monitoring and assessments. Questions answered. Will update [facility] family medicine.

## 2019-05-17 NOTE — PLAN OF CARE
0251- Spoke to Dr Ramírez. Orders to DC pt at this time. Dc instructions as normal for labor precautions to be given.

## 2019-05-17 NOTE — PLAN OF CARE
0256- discharge papers given to patient. Pt and FOB getting dressed. Understand discharge instructions.

## 2019-05-23 ENCOUNTER — TELEPHONE (OUTPATIENT)
Dept: FAMILY MEDICINE | Facility: HOSPITAL | Age: 24
End: 2019-05-23

## 2019-05-23 NOTE — TELEPHONE ENCOUNTER
----- Message from Jazmín Zazueta sent at 5/23/2019 12:26 PM CDT -----  Pt needs a letter stating how many weeks she is and when her due date is because medicaid is terminating her because the information they have states that she should have given birth to her baby this month. Needs it before May 30th. Please call pt to advise

## 2019-05-26 ENCOUNTER — HOSPITAL ENCOUNTER (OUTPATIENT)
Facility: HOSPITAL | Age: 24
Discharge: HOME | End: 2019-05-26
Attending: OBSTETRICS & GYNECOLOGY | Admitting: OBSTETRICS & GYNECOLOGY
Payer: MEDICAID

## 2019-05-26 VITALS
HEART RATE: 92 BPM | WEIGHT: 178 LBS | BODY MASS INDEX: 32.76 KG/M2 | RESPIRATION RATE: 17 BRPM | HEIGHT: 62 IN | OXYGEN SATURATION: 99 % | SYSTOLIC BLOOD PRESSURE: 123 MMHG | DIASTOLIC BLOOD PRESSURE: 67 MMHG | TEMPERATURE: 99 F

## 2019-05-26 DIAGNOSIS — N89.8 VAGINAL DISCHARGE: ICD-10-CM

## 2019-05-26 PROCEDURE — 99211 OFF/OP EST MAY X REQ PHY/QHP: CPT

## 2019-05-26 NOTE — NURSING
1024: Family medicine resident called. Updated on pt. Per pt, slightly yellow, pink, clean yesterday. Slightly red today. Denies LOF, bright-red bleeding. Denies history of complications or medical history. Upon SVE, pt is 1.5/50/-2. Small amount of white discharge on gloves but no bleeding. . + accels, no decels. Luz every 6 minutes.  Per MD, will call back with any additional information or questions.

## 2019-05-26 NOTE — NURSING
1420: D/C education provided and reviewed. Pt educated on reasons to return to hospital (leaking of fluid, bright-red bleeding, arti every 3-5 minutes for at least an hour, or not feeling baby move at least 10 x in 2 hours). Pt provided with education on signs of labor. Pt verbalized understanding. Questions encouraged and answered. Pt D/C home per MD order.

## 2019-05-26 NOTE — NURSING
1005: 23 year old G 2 P 1 at 38 weeks and 3 days received to triage C/O vaginal discharge x 2 days and 1/10 vaginal pressure. History/complications of none. No vaginal bleeding, no leaking fluid. Fetus: fetal heart tones 140, + fetal movements.Contractions every 6 min. Abdomen soft, non-tender. Vital signs stable. Cervix: 2/50/-2. Educated on electronic fetal monitoring and assessments. Questions answered. Will update family medicine.

## 2019-05-26 NOTE — NURSING
1252: Per MD, keep pt until 2 pm. Give regular diet and allow to ambulate. Okay to d/c home if CESAR/BPP within normal ranges and no cervical change. Pt does not require continuous toco and U/S monitoring. Call MD with update prior to D/C.

## 2019-05-26 NOTE — NURSING
1408: Dr. Dial updated on patient. BPP: 8/8. CESAR 231 mm with borderline polyhydraminos. SVE: unchanged.  Per john SMITH to D/C patient home.

## 2019-05-26 NOTE — NURSING
Late entry: 1100: Charge nurse called [facility] for update. Per Dr. Hirsch, will call back. Will continue to monitor pt.  1112: Dr. Dial called unit for update on pt. FHR: 145, moderate variability. + accels. No decels. Per MD, will be by to check pt. Place regular triage orders.

## 2019-05-27 PROBLEM — R10.9 ABDOMINAL CRAMPING: Status: ACTIVE | Noted: 2019-05-27

## 2019-05-28 ENCOUNTER — HOSPITAL ENCOUNTER (INPATIENT)
Facility: HOSPITAL | Age: 24
LOS: 2 days | Discharge: HOME | End: 2019-05-30
Attending: FAMILY MEDICINE | Admitting: FAMILY MEDICINE
Payer: MEDICAID

## 2019-05-28 ENCOUNTER — ANESTHESIA (OUTPATIENT)
Dept: OBSTETRICS AND GYNECOLOGY | Facility: HOSPITAL | Age: 24
End: 2019-05-28
Payer: MEDICAID

## 2019-05-28 ENCOUNTER — ANESTHESIA EVENT (OUTPATIENT)
Dept: OBSTETRICS AND GYNECOLOGY | Facility: HOSPITAL | Age: 24
End: 2019-05-28
Payer: MEDICAID

## 2019-05-28 DIAGNOSIS — Z98.51 S/P TUBAL LIGATION: ICD-10-CM

## 2019-05-28 LAB
ABO + RH BLD: NORMAL
BASOPHILS # BLD AUTO: 0.01 K/UL (ref 0–0.2)
BASOPHILS NFR BLD: 0.1 % (ref 0–1.9)
BLD GP AB SCN CELLS X3 SERPL QL: NORMAL
DIFFERENTIAL METHOD: ABNORMAL
EOSINOPHIL # BLD AUTO: 0.2 K/UL (ref 0–0.5)
EOSINOPHIL NFR BLD: 1.6 % (ref 0–8)
ERYTHROCYTE [DISTWIDTH] IN BLOOD BY AUTOMATED COUNT: 14.7 % (ref 11.5–14.5)
HCT VFR BLD AUTO: 36.8 % (ref 37–48.5)
HGB BLD-MCNC: 12 G/DL (ref 12–16)
LYMPHOCYTES # BLD AUTO: 1.8 K/UL (ref 1–4.8)
LYMPHOCYTES NFR BLD: 15.8 % (ref 18–48)
MCH RBC QN AUTO: 27.5 PG (ref 27–31)
MCHC RBC AUTO-ENTMCNC: 32.6 G/DL (ref 32–36)
MCV RBC AUTO: 84 FL (ref 82–98)
MONOCYTES # BLD AUTO: 1 K/UL (ref 0.3–1)
MONOCYTES NFR BLD: 9.3 % (ref 4–15)
NEUTROPHILS # BLD AUTO: 8.1 K/UL (ref 1.8–7.7)
NEUTROPHILS NFR BLD: 72.7 % (ref 38–73)
PLATELET # BLD AUTO: 190 K/UL (ref 150–350)
PMV BLD AUTO: 10.7 FL (ref 9.2–12.9)
RBC # BLD AUTO: 4.37 M/UL (ref 4–5.4)
WBC # BLD AUTO: 11.11 K/UL (ref 3.9–12.7)

## 2019-05-28 PROCEDURE — 62326 NJX INTERLAMINAR LMBR/SAC: CPT | Performed by: ANESTHESIOLOGY

## 2019-05-28 PROCEDURE — 11000001 HC ACUTE MED/SURG PRIVATE ROOM

## 2019-05-28 PROCEDURE — 99211 OFF/OP EST MAY X REQ PHY/QHP: CPT | Mod: 25

## 2019-05-28 PROCEDURE — 63600175 PHARM REV CODE 636 W HCPCS: Performed by: STUDENT IN AN ORGANIZED HEALTH CARE EDUCATION/TRAINING PROGRAM

## 2019-05-28 PROCEDURE — 27200710 HC EPIDURAL INFUSION PUMP SET: Performed by: ANESTHESIOLOGY

## 2019-05-28 PROCEDURE — 72200004 HC VAGINAL DELIVERY LEVEL I

## 2019-05-28 PROCEDURE — 72100002 HC LABOR CARE, 1ST 8 HOURS

## 2019-05-28 PROCEDURE — 59025 FETAL NON-STRESS TEST: CPT

## 2019-05-28 PROCEDURE — 86850 RBC ANTIBODY SCREEN: CPT

## 2019-05-28 PROCEDURE — 63600175 PHARM REV CODE 636 W HCPCS: Performed by: FAMILY MEDICINE

## 2019-05-28 PROCEDURE — 27800516 HC TRAY, EPIDURAL COMBO: Performed by: ANESTHESIOLOGY

## 2019-05-28 PROCEDURE — 85025 COMPLETE CBC W/AUTO DIFF WBC: CPT

## 2019-05-28 PROCEDURE — 25000003 PHARM REV CODE 250: Performed by: FAMILY MEDICINE

## 2019-05-28 RX ORDER — OXYTOCIN/RINGER'S LACTATE 20/1000 ML
333 PLASTIC BAG, INJECTION (ML) INTRAVENOUS CONTINUOUS
Status: DISPENSED | OUTPATIENT
Start: 2019-05-28 | End: 2019-05-28

## 2019-05-28 RX ORDER — OXYTOCIN/RINGER'S LACTATE 20/1000 ML
41.7 PLASTIC BAG, INJECTION (ML) INTRAVENOUS CONTINUOUS
Status: DISCONTINUED | OUTPATIENT
Start: 2019-05-28 | End: 2019-05-29

## 2019-05-28 RX ORDER — SODIUM CHLORIDE 9 MG/ML
INJECTION, SOLUTION INTRAVENOUS
Status: DISCONTINUED | OUTPATIENT
Start: 2019-05-28 | End: 2019-05-29

## 2019-05-28 RX ORDER — FENTANYL CITRATE 50 UG/ML
INJECTION, SOLUTION INTRAMUSCULAR; INTRAVENOUS
Status: DISCONTINUED | OUTPATIENT
Start: 2019-05-28 | End: 2019-05-28

## 2019-05-28 RX ORDER — ROPIVACAINE HYDROCHLORIDE 2 MG/ML
INJECTION, SOLUTION EPIDURAL; INFILTRATION; PERINEURAL
Status: COMPLETED
Start: 2019-05-28 | End: 2019-05-28

## 2019-05-28 RX ORDER — SODIUM CHLORIDE, SODIUM LACTATE, POTASSIUM CHLORIDE, CALCIUM CHLORIDE 600; 310; 30; 20 MG/100ML; MG/100ML; MG/100ML; MG/100ML
INJECTION, SOLUTION INTRAVENOUS CONTINUOUS
Status: DISCONTINUED | OUTPATIENT
Start: 2019-05-28 | End: 2019-05-29

## 2019-05-28 RX ORDER — ROPIVACAINE HYDROCHLORIDE 2 MG/ML
INJECTION, SOLUTION EPIDURAL; INFILTRATION; PERINEURAL CONTINUOUS PRN
Status: DISCONTINUED | OUTPATIENT
Start: 2019-05-28 | End: 2019-05-28

## 2019-05-28 RX ORDER — ONDANSETRON 8 MG/1
8 TABLET, ORALLY DISINTEGRATING ORAL EVERY 8 HOURS PRN
Status: DISCONTINUED | OUTPATIENT
Start: 2019-05-28 | End: 2019-05-29

## 2019-05-28 RX ORDER — NALOXONE HCL 0.4 MG/ML
0.02 VIAL (ML) INJECTION
Status: DISCONTINUED | OUTPATIENT
Start: 2019-05-28 | End: 2019-05-29

## 2019-05-28 RX ADMIN — FENTANYL CITRATE 400 MCG: 50 INJECTION, SOLUTION INTRAMUSCULAR; INTRAVENOUS at 08:05

## 2019-05-28 RX ADMIN — DEXTROSE 5 MILLION UNITS: 50 INJECTION, SOLUTION INTRAVENOUS at 08:05

## 2019-05-28 RX ADMIN — SODIUM CHLORIDE, SODIUM LACTATE, POTASSIUM CHLORIDE, AND CALCIUM CHLORIDE 1000 ML: .6; .31; .03; .02 INJECTION, SOLUTION INTRAVENOUS at 09:05

## 2019-05-28 RX ADMIN — ROPIVACAINE HYDROCHLORIDE 12 ML/HR: 2 INJECTION, SOLUTION EPIDURAL; INFILTRATION at 08:05

## 2019-05-28 RX ADMIN — SODIUM CHLORIDE, SODIUM LACTATE, POTASSIUM CHLORIDE, AND CALCIUM CHLORIDE: .6; .31; .03; .02 INJECTION, SOLUTION INTRAVENOUS at 08:05

## 2019-05-29 ENCOUNTER — ANESTHESIA (OUTPATIENT)
Dept: OBSTETRICS AND GYNECOLOGY | Facility: HOSPITAL | Age: 24
End: 2019-05-29
Payer: MEDICAID

## 2019-05-29 ENCOUNTER — ANESTHESIA EVENT (OUTPATIENT)
Dept: OBSTETRICS AND GYNECOLOGY | Facility: HOSPITAL | Age: 24
End: 2019-05-29
Payer: MEDICAID

## 2019-05-29 PROBLEM — Z98.51 S/P TUBAL LIGATION: Status: ACTIVE | Noted: 2019-05-29

## 2019-05-29 LAB
BASOPHILS # BLD AUTO: 0.02 K/UL (ref 0–0.2)
BASOPHILS NFR BLD: 0.1 % (ref 0–1.9)
DIFFERENTIAL METHOD: ABNORMAL
EOSINOPHIL # BLD AUTO: 0.1 K/UL (ref 0–0.5)
EOSINOPHIL NFR BLD: 0.9 % (ref 0–8)
ERYTHROCYTE [DISTWIDTH] IN BLOOD BY AUTOMATED COUNT: 14.7 % (ref 11.5–14.5)
HCT VFR BLD AUTO: 36.4 % (ref 37–48.5)
HGB BLD-MCNC: 12 G/DL (ref 12–16)
LYMPHOCYTES # BLD AUTO: 1.9 K/UL (ref 1–4.8)
LYMPHOCYTES NFR BLD: 13.2 % (ref 18–48)
MCH RBC QN AUTO: 27.6 PG (ref 27–31)
MCHC RBC AUTO-ENTMCNC: 33 G/DL (ref 32–36)
MCV RBC AUTO: 84 FL (ref 82–98)
MONOCYTES # BLD AUTO: 1.5 K/UL (ref 0.3–1)
MONOCYTES NFR BLD: 9.8 % (ref 4–15)
NEUTROPHILS # BLD AUTO: 11.1 K/UL (ref 1.8–7.7)
NEUTROPHILS NFR BLD: 75.5 % (ref 38–73)
PLATELET # BLD AUTO: 194 K/UL (ref 150–350)
PMV BLD AUTO: 11 FL (ref 9.2–12.9)
RBC # BLD AUTO: 4.34 M/UL (ref 4–5.4)
WBC # BLD AUTO: 14.74 K/UL (ref 3.9–12.7)

## 2019-05-29 PROCEDURE — 88302 TISSUE EXAM BY PATHOLOGIST: CPT | Performed by: PATHOLOGY

## 2019-05-29 PROCEDURE — 58605 PR LIGATE FALLOPIAN TUBE,POSTPARTUM: ICD-10-PCS | Mod: ,,, | Performed by: OBSTETRICS & GYNECOLOGY

## 2019-05-29 PROCEDURE — 88302 TISSUE EXAM BY PATHOLOGIST: CPT | Mod: 26,,, | Performed by: PATHOLOGY

## 2019-05-29 PROCEDURE — 63600175 PHARM REV CODE 636 W HCPCS: Performed by: OBSTETRICS & GYNECOLOGY

## 2019-05-29 PROCEDURE — 37000009 HC ANESTHESIA EA ADD 15 MINS: Performed by: OBSTETRICS & GYNECOLOGY

## 2019-05-29 PROCEDURE — 11000001 HC ACUTE MED/SURG PRIVATE ROOM

## 2019-05-29 PROCEDURE — 58605 DIVISION OF FALLOPIAN TUBE: CPT

## 2019-05-29 PROCEDURE — 25000003 PHARM REV CODE 250: Performed by: STUDENT IN AN ORGANIZED HEALTH CARE EDUCATION/TRAINING PROGRAM

## 2019-05-29 PROCEDURE — 88302 TISSUE SPECIMEN TO PATHOLOGY - SURGERY: ICD-10-PCS | Mod: 26,,, | Performed by: PATHOLOGY

## 2019-05-29 PROCEDURE — 58605 DIVISION OF FALLOPIAN TUBE: CPT | Mod: ,,, | Performed by: OBSTETRICS & GYNECOLOGY

## 2019-05-29 PROCEDURE — 72200004 HC VAGINAL DELIVERY LEVEL I

## 2019-05-29 PROCEDURE — 37000008 HC ANESTHESIA 1ST 15 MINUTES: Performed by: OBSTETRICS & GYNECOLOGY

## 2019-05-29 PROCEDURE — 36415 COLL VENOUS BLD VENIPUNCTURE: CPT

## 2019-05-29 PROCEDURE — 72100002 HC LABOR CARE, 1ST 8 HOURS

## 2019-05-29 PROCEDURE — 51702 INSERT TEMP BLADDER CATH: CPT

## 2019-05-29 PROCEDURE — 85025 COMPLETE CBC W/AUTO DIFF WBC: CPT

## 2019-05-29 PROCEDURE — 25000003 PHARM REV CODE 250: Performed by: OBSTETRICS & GYNECOLOGY

## 2019-05-29 PROCEDURE — 63600175 PHARM REV CODE 636 W HCPCS: Performed by: STUDENT IN AN ORGANIZED HEALTH CARE EDUCATION/TRAINING PROGRAM

## 2019-05-29 PROCEDURE — 63600175 PHARM REV CODE 636 W HCPCS: Performed by: ANESTHESIOLOGY

## 2019-05-29 RX ORDER — DEXTROSE MONOHYDRATE, SODIUM CHLORIDE, AND POTASSIUM CHLORIDE 50; 1.49; 4.5 G/1000ML; G/1000ML; G/1000ML
INJECTION, SOLUTION INTRAVENOUS CONTINUOUS
Status: DISCONTINUED | OUTPATIENT
Start: 2019-05-29 | End: 2019-05-29

## 2019-05-29 RX ORDER — SUCCINYLCHOLINE CHLORIDE 20 MG/ML
INJECTION INTRAMUSCULAR; INTRAVENOUS
Status: DISCONTINUED | OUTPATIENT
Start: 2019-05-29 | End: 2019-05-29

## 2019-05-29 RX ORDER — HYDROCODONE BITARTRATE AND ACETAMINOPHEN 5; 325 MG/1; MG/1
1 TABLET ORAL EVERY 4 HOURS PRN
Status: DISCONTINUED | OUTPATIENT
Start: 2019-05-29 | End: 2019-05-30 | Stop reason: HOSPADM

## 2019-05-29 RX ORDER — HYDROMORPHONE HYDROCHLORIDE 1 MG/ML
0.2 INJECTION, SOLUTION INTRAMUSCULAR; INTRAVENOUS; SUBCUTANEOUS EVERY 6 HOURS PRN
Status: DISCONTINUED | OUTPATIENT
Start: 2019-05-29 | End: 2019-05-30 | Stop reason: HOSPADM

## 2019-05-29 RX ORDER — OXYTOCIN/RINGER'S LACTATE 20/1000 ML
41.65 PLASTIC BAG, INJECTION (ML) INTRAVENOUS CONTINUOUS
Status: DISCONTINUED | OUTPATIENT
Start: 2019-05-29 | End: 2019-05-29

## 2019-05-29 RX ORDER — ONDANSETRON 2 MG/ML
4 INJECTION INTRAMUSCULAR; INTRAVENOUS DAILY PRN
Status: DISCONTINUED | OUTPATIENT
Start: 2019-05-29 | End: 2019-05-30 | Stop reason: HOSPADM

## 2019-05-29 RX ORDER — DOCUSATE SODIUM 100 MG/1
200 CAPSULE, LIQUID FILLED ORAL 2 TIMES DAILY PRN
Status: DISCONTINUED | OUTPATIENT
Start: 2019-05-29 | End: 2019-05-30 | Stop reason: HOSPADM

## 2019-05-29 RX ORDER — ACETAMINOPHEN 325 MG/1
650 TABLET ORAL EVERY 6 HOURS PRN
Status: DISCONTINUED | OUTPATIENT
Start: 2019-05-29 | End: 2019-05-30 | Stop reason: HOSPADM

## 2019-05-29 RX ORDER — DIPHENHYDRAMINE HYDROCHLORIDE 50 MG/ML
12.5 INJECTION INTRAMUSCULAR; INTRAVENOUS EVERY 6 HOURS PRN
Status: DISCONTINUED | OUTPATIENT
Start: 2019-05-29 | End: 2019-05-30 | Stop reason: HOSPADM

## 2019-05-29 RX ORDER — CEFAZOLIN SODIUM 2 G/50ML
2 SOLUTION INTRAVENOUS ONCE
Status: DISCONTINUED | OUTPATIENT
Start: 2019-05-29 | End: 2019-05-29

## 2019-05-29 RX ORDER — HYDROCODONE BITARTRATE AND ACETAMINOPHEN 10; 325 MG/1; MG/1
1 TABLET ORAL EVERY 4 HOURS PRN
Status: DISCONTINUED | OUTPATIENT
Start: 2019-05-29 | End: 2019-05-30 | Stop reason: HOSPADM

## 2019-05-29 RX ORDER — SODIUM CHLORIDE, SODIUM LACTATE, POTASSIUM CHLORIDE, CALCIUM CHLORIDE 600; 310; 30; 20 MG/100ML; MG/100ML; MG/100ML; MG/100ML
INJECTION, SOLUTION INTRAVENOUS CONTINUOUS PRN
Status: DISCONTINUED | OUTPATIENT
Start: 2019-05-29 | End: 2019-05-29

## 2019-05-29 RX ORDER — DIPHENHYDRAMINE HYDROCHLORIDE 50 MG/ML
25 INJECTION INTRAMUSCULAR; INTRAVENOUS EVERY 4 HOURS PRN
Status: DISCONTINUED | OUTPATIENT
Start: 2019-05-29 | End: 2019-05-29

## 2019-05-29 RX ORDER — PROPOFOL 10 MG/ML
VIAL (ML) INTRAVENOUS
Status: DISCONTINUED | OUTPATIENT
Start: 2019-05-29 | End: 2019-05-29

## 2019-05-29 RX ORDER — HYDROMORPHONE HYDROCHLORIDE 2 MG/ML
0.5 INJECTION, SOLUTION INTRAMUSCULAR; INTRAVENOUS; SUBCUTANEOUS EVERY 5 MIN PRN
Status: DISPENSED | OUTPATIENT
Start: 2019-05-29 | End: 2019-05-29

## 2019-05-29 RX ORDER — LIDOCAINE HCL/EPINEPHRINE/PF 2%-1:200K
VIAL (ML) INJECTION
Status: DISCONTINUED | OUTPATIENT
Start: 2019-05-29 | End: 2019-05-29

## 2019-05-29 RX ORDER — SODIUM CHLORIDE 0.9 % (FLUSH) 0.9 %
3 SYRINGE (ML) INJECTION
Status: DISCONTINUED | OUTPATIENT
Start: 2019-05-29 | End: 2019-05-30 | Stop reason: HOSPADM

## 2019-05-29 RX ORDER — ONDANSETRON 8 MG/1
8 TABLET, ORALLY DISINTEGRATING ORAL EVERY 8 HOURS PRN
Status: DISCONTINUED | OUTPATIENT
Start: 2019-05-29 | End: 2019-05-29

## 2019-05-29 RX ORDER — MISOPROSTOL 200 UG/1
600 TABLET ORAL
Status: DISCONTINUED | OUTPATIENT
Start: 2019-05-29 | End: 2019-05-30 | Stop reason: HOSPADM

## 2019-05-29 RX ORDER — DIPHENHYDRAMINE HCL 25 MG
25 CAPSULE ORAL EVERY 4 HOURS PRN
Status: DISCONTINUED | OUTPATIENT
Start: 2019-05-29 | End: 2019-05-30 | Stop reason: HOSPADM

## 2019-05-29 RX ORDER — ESMOLOL HYDROCHLORIDE 10 MG/ML
INJECTION INTRAVENOUS
Status: DISCONTINUED | OUTPATIENT
Start: 2019-05-29 | End: 2019-05-29

## 2019-05-29 RX ORDER — IBUPROFEN 600 MG/1
600 TABLET ORAL EVERY 6 HOURS PRN
Status: DISCONTINUED | OUTPATIENT
Start: 2019-05-29 | End: 2019-05-30 | Stop reason: HOSPADM

## 2019-05-29 RX ADMIN — HYDROMORPHONE HYDROCHLORIDE 0.5 MG: 2 INJECTION, SOLUTION INTRAMUSCULAR; INTRAVENOUS; SUBCUTANEOUS at 08:05

## 2019-05-29 RX ADMIN — ESMOLOL HYDROCHLORIDE 10 MG: 10 INJECTION, SOLUTION INTRAVENOUS at 07:05

## 2019-05-29 RX ADMIN — IBUPROFEN 600 MG: 600 TABLET ORAL at 02:05

## 2019-05-29 RX ADMIN — PROPOFOL 100 MG: 10 INJECTION, EMULSION INTRAVENOUS at 07:05

## 2019-05-29 RX ADMIN — SUCCINYLCHOLINE CHLORIDE 100 MG: 20 INJECTION, SOLUTION INTRAMUSCULAR; INTRAVENOUS at 07:05

## 2019-05-29 RX ADMIN — HYDROMORPHONE HYDROCHLORIDE 0.2 MG: 1 INJECTION, SOLUTION INTRAMUSCULAR; INTRAVENOUS; SUBCUTANEOUS at 10:05

## 2019-05-29 RX ADMIN — SODIUM CHLORIDE, SODIUM LACTATE, POTASSIUM CHLORIDE, AND CALCIUM CHLORIDE: .6; .31; .03; .02 INJECTION, SOLUTION INTRAVENOUS at 07:05

## 2019-05-29 RX ADMIN — IBUPROFEN 600 MG: 600 TABLET ORAL at 07:05

## 2019-05-29 RX ADMIN — HYDROCODONE BITARTRATE AND ACETAMINOPHEN 1 TABLET: 10; 325 TABLET ORAL at 07:05

## 2019-05-29 RX ADMIN — SODIUM CHLORIDE 2 G: 9 INJECTION, SOLUTION INTRAVENOUS at 07:05

## 2019-05-29 RX ADMIN — HYDROCODONE BITARTRATE AND ACETAMINOPHEN 1 TABLET: 10; 325 TABLET ORAL at 02:05

## 2019-05-29 RX ADMIN — PROPOFOL 150 MG: 10 INJECTION, EMULSION INTRAVENOUS at 07:05

## 2019-05-29 RX ADMIN — LIDOCAINE HYDROCHLORIDE AND EPINEPHRINE 10 ML: 20; 5 INJECTION, SOLUTION EPIDURAL; INFILTRATION; INTRACAUDAL; PERINEURAL at 07:05

## 2019-05-29 RX ADMIN — LIDOCAINE HYDROCHLORIDE AND EPINEPHRINE 5 ML: 20; 5 INJECTION, SOLUTION EPIDURAL; INFILTRATION; INTRACAUDAL; PERINEURAL at 07:05

## 2019-05-29 NOTE — ANESTHESIA PROCEDURE NOTES
CSE    Patient location during procedure: OB  Start time: 5/28/2019 8:46 PM  Timeout: 5/28/2019 8:45 PM  End time: 5/28/2019 8:52 PM  Staffing  Anesthesiologist: Quintin Palacio MD  Resident/CRNA: Gilberto Vernon MD  Performed: resident/CRNA   Preanesthetic Checklist  Completed: patient identified, site marked, surgical consent, pre-op evaluation, timeout performed, IV checked, risks and benefits discussed and monitors and equipment checked  CSE  Patient position: sitting  Prep: ChloraPrep  Spinal Needle  Needle type: pencil-tip   Needle gauge: 25 G  Needle length: 3.5 in  Epidural Needle  Injection technique: VALERIANO air  Needle type: Tuohy   Needle gauge: 17 G  Needle length: 3.5 in  Needle insertion depth: 6.5 cm  Location: L4-5  Needle localization: anatomical landmarks  Catheter  Catheter type: [employer]  Catheter size: 19 G  Catheter at skin depth: 12 cm  Test dose: lidocaine 1.5% with Epi 1-to-200,000  Additional Documentation: negative aspiration for CSF, negative aspiration for heme, no paresthesia on injection and negative test dose  Assessment  Sensory level: T10   Dermatomal levels determined by alcohol swab

## 2019-05-29 NOTE — NURSING
"1025 - went in to witness Dilaudid waste.  0.8mg to be wasted in pyxis; however, accidentally "wasted 0.2" in pyxis (which was the dose given to pt).  Notified pharmacy about error.  Per pharmacy, document error in pt's chart.  Witnessed RN waste 0.8mg.   "

## 2019-05-29 NOTE — PROGRESS NOTES
OBGYN Note    S: patient doing well. No pain. Min bleeding. Confident that she wants to proceed with PP BTL    O  Temp:  [98.1 °F (36.7 °C)-98.7 °F (37.1 °C)]   Pulse:  []   Resp:  [17-18]   BP: (109-130)/(56-75)   SpO2:  [91 %-100 %]    Gen: A&Ox3, nAD  Abd: soft, fundus firm above the umbilicus, nontender  Ext: 1+ edema bilaterally   Peripad: normal lochia    Lab Results   Component Value Date    WBC 14.74 (H) 2019    HGB 12.0 2019    HCT 36.4 (L) 2019    MCV 84 2019     2019     O POS    AP: 24 yo now  female s/p uncomplicated , desires permanent sterilization.  Will proceed to OR for PP BTL  Consents reviewed and in the chart    dudley jiang MD

## 2019-05-29 NOTE — TRANSFER OF CARE
"Anesthesia Transfer of Care Note    Patient: IAIN GERONIMO    Procedure(s) Performed: Procedure(s) (LRB):  LIGATION, FALLOPIAN TUBE, POSTPARTUM (N/A)    Patient location: PACU    Anesthesia Type: epidural and general    Transport from OR: Transported from OR on 6-10 L/min O2 by face mask with adequate spontaneous ventilation    Post pain: adequate analgesia    Post assessment: no apparent anesthetic complications    Post vital signs: stable    Level of consciousness: awake and alert    Nausea/Vomiting: no nausea/vomiting    Complications: none    Transfer of care protocol was followed      Last vitals:   Visit Vitals  /63   Pulse 83   Temp 36.8 °C (98.3 °F) (Oral)   Resp 14   Ht 5' 2" (1.575 m)   Wt 81.6 kg (180 lb)   LMP 08/31/2018 (Exact Date)   SpO2 100%   Breastfeeding? Unknown   BMI 32.92 kg/m²     "

## 2019-05-29 NOTE — PLAN OF CARE
Problem: Adult Inpatient Plan of Care  Goal: Plan of Care Review  Outcome: Ongoing (interventions implemented as appropriate)  Pt NPO for scheduled BTL this AM. Pt ambulating without difficulty. Has voided post delivery. Rubra light/moderate throughout the night. Pain well controlled with ordered pain meds. Epidural cath remains still intact for surgery. IV saline locked. VSS. No distress noted. Bonding well with infant. Family supportive in room.

## 2019-05-29 NOTE — OP NOTE
Operative Note    Date: 2019  Time: 8:01 AM    Preoperative Diagnosis:  --S/p  PPD #1  --Desires permanent sterilization    Postoperative Diagnosis: same  Procedure: s/p PP BTL  Type of Anesthesia: LMA  Surgeon: Quirino Marin MD  Assistant Surgeon: Agustina Jay  Specimen/Tissue Removed: portions of right and left fallopian tubes  Estimated Blood Loss: min  Urine Output: 1200 cc clear urine  IV Fluids: 500 cc LR   Complications: none  Findings: normal appearing tubes bilaterally    TECHNIQUE: The patient was taken to the Operating Room where her epidural was   found to be adequate. She was then prepared and draped in normal sterile   fashion. An infraumbilical incision was started with the scalpel and taken down  to underlying layer of adipose tissue to the fascia. The fascia was grasped   with Kocher clamps and then entered sharply with daniel scissors. The fascia was   tagged with 0 Vicryl suture. The patient's left tube was identified, grasped   with Babcocks and the fimbria of the tube was identified. The fallopian tube   was then regrasped approximately 3 to 4 cm from the cornua. An opening was made  in the mesosalpinx with the Bovie. Plain gut suture was used to tie off a   knuckle of this tube and then it was cut and that tissue was sent to Pathology.   Hemostasis was noted. This tube was returned to the abdomen. Attention was   turned to the patient's right side where her right tube was identified out to   the fimbria. It was grasped with the Yani to do this. It was then regrasped  approximately 3 to 4 cm from the cornua. An opening was made in the   mesosalpinx with the Bovie. The plain gut suture was then used to tie off a   portion of the tube. It was cut and that tissue was sent to Pathology.   Hemostasis was noted. Tube was returned to the abdomen. The abdomen was   resurveyed and noted to be hemostatic. The fascia was then closed with 0 Vicryl  suture in a running fashion. Subcutaneous  tissue was closed with   figure-of-eight of 0 Vicryl suture. Skin was then closed with 4-0 Monocryl   suture.     The patient tolerated the procedure well. Sponge, lap and needle counts were   correct x3. The patient was taken to the PACU in stable condition.      KRISSY Marin MD

## 2019-05-29 NOTE — PLAN OF CARE
Problem: Adult Inpatient Plan of Care  Goal: Plan of Care Review  Outcome: Ongoing (interventions implemented as appropriate)  POC reviewed with patient. Pt verbalized understanding. VSS. NAD noted. Pt reports pain goal met with prescribed medications per MD.  Ambulating freely in room.  Tolerating regular diet. Bonding with baby. Smiles appropriately at baby. Family support noted at bedside.  Remains free from falls and injury. Will continue to monitor.

## 2019-05-29 NOTE — L&D DELIVERY NOTE
Delivery Note  2019      [facility] Delivering physician:   Milady Lewis MD                                                                        Assistant:  Everette Dial MD    Supervising Physician:  Byron Dove MD     Pre-Delivery Diagnosis: Term pregnancy     Post-Delivery Diagnosis: Living  infant female    Procedure: Spontaneous vaginal delivery    Anesthesia: Epidural anesthesia    ASA Class: 1    Episiotomy or Incision: none    Indications for instrumental delivery: N/A    Infant Wt: pending          Apgars: 1' (8)  /  5' (9)      Placenta and Cord:          Mechanism: spontaneous        Description:  3 vessel cord    Estimated Blood Loss: 300            Placenta sent to path:  no           Complications:  none           Condition: stable    Birth information:  YOB: 2018   Time of birth: 11:31 PM   Sex: female   Head Delivery Date/Time: 2019 11:31 PM   Delivery type: Vaginal, Spontaneous   Gestational Age: 38w5d    Delivery Providers    Delivering clinician:  Milady Lewis   Provider Role    MD Everette Beatty MD Stephanie S. Cunningham, REGINA             Measurements    Weight:  3229 g  Length:           Apgars    Living status:  Living  Apgars:   1 min.:   5 min.:   10 min.:   15 min.:   20 min.:     Skin color:   1  1       Heart rate:   2  2       Reflex irritability:   2  2       Muscle tone:   2  2       Respiratory effort:   2  2       Total:   9  9       Apgars assigned by:  BUSHRA TAPIA         Operative Delivery    Forceps attempted?:  No  Vacuum extractor attempted?:  No         Shoulder Dystocia    Shoulder dystocia present?:  No           Presentation    Presentation:  Vertex  Position:  Left Occiput Anterior           Interventions/Resuscitation    Method:  Bulb Suctioning, Tactile Stimulation       Cord    Vessels:  3 vessels  Complications:  None  Delayed Cord Clamping?:  No  Cord Blood Disposition:  Sent with Baby  Gases  Sent?:  No  Stem Cell Collection (by MD):  No       Placenta    Placenta delivery date/time:  2019 2341  Placenta removal:  Spontaneous  Placenta appearance:  Intact  Placenta disposition:  discarded           Labor Events:       labor: No     Labor Onset Date/Time:         Dilation Complete Date/Time: 2019 22:44     Start Pushing Date/Time: 2019 23:28     Rupture Date/Time: 19  1800         Rupture type:           Fluid Amount:        Fluid Color:        Fluid Odor:        Membrane Status (PeriCalm):        Rupture Date/Time (PeriCalm):        Fluid Amount (PeriCalm):        Fluid Color (PeriCalm):         steroids:       Antibiotics given for GBS: No     Induction:       Indications for induction:        Augmentation:       Indications for augmentation:       Labor complications: None     Additional complications:          Cervical ripening:                     Delivery:      Episiotomy: None     Indication for Episiotomy:       Perineal Lacerations: None Repaired:      Periurethral Laceration:   Repaired:     Labial Laceration:   Repaired:     Sulcus Laceration:   Repaired:     Vaginal Laceration:   Repaired:     Cervical Laceration:   Repaired:     Repair suture: None     Repair # of packets:       Last Value - EBL - Nursing (mL):       Sum - EBL - Nursing (mL): 0     Last Value - EBL - Anesthesia (mL):      Calculated QBL (mL): 250      Vaginal Sweep Performed: Yes     Surgicount Correct: Yes       Other providers:       Anesthesia    Method:  Epidural             Patient wishes for tubal ligation in the AM with Dr. Marin.  Will make NPO.

## 2019-05-29 NOTE — PLAN OF CARE
Received report from ferdinand zelaya on pt.  last night, pt scheduled for PP tubal ligation. Pt requesting comfortably in bed. Npo. Fluid bolus started.

## 2019-05-29 NOTE — NURSING
1028 0.2 dilaudid given per MD order for pain. Wasted with RN in pyxis. Error in pyxis documented 0.2 wasted when 0.8 was wasted. Contacted pharmacy of error. 0.8 wasted in med room, 2nd RN verified accurate waste.

## 2019-05-29 NOTE — ANESTHESIA PREPROCEDURE EVALUATION
2019  IAIN GERONIMO is a 23 y.o., female s/p  this admission scheduled for bilateral tubal ligation      Anesthesia Evaluation    I have reviewed the Patient Summary Reports.        Review of Systems  Cardiovascular:  Cardiovascular Normal Exercise tolerance: good     Pulmonary:  Pulmonary Normal    Neurological:  Neurology Normal      Lab Results   Component Value Date    WBC 14.74 (H) 2019    HGB 12.0 2019    HCT 36.4 (L) 2019    MCV 84 2019     2019         Physical Exam  General:  Well nourished    Airway/Jaw/Neck:  Airway Findings: Mouth Opening: Normal General Airway Assessment: Adult  Mallampati: II  TM Distance: Normal, at least 6 cm       Chest/Lungs:  Chest/Lungs Findings: Clear to auscultation, Normal Respiratory Rate         Mental Status:  Mental Status Findings:  Cooperative, Alert and Oriented         Anesthesia Plan  Type of Anesthesia, risks & benefits discussed:  Anesthesia Type:  epidural  Patient's Preference: s/p CSE with epidural still in place, will use for this procedure  Intra-op Monitoring Plan:   Intra-op Monitoring Plan Comments:   Post Op Pain Control Plan:   Post Op Pain Control Plan Comments:   Induction:    Beta Blocker:  Patient is not currently on a Beta-Blocker (No further documentation required).       Informed Consent: Patient understands risks and agrees with Anesthesia plan.  Questions answered.   ASA Score: 2     Day of Surgery Review of History & Physical:            Ready For Surgery From Anesthesia Perspective.

## 2019-05-29 NOTE — H&P
HISTORY AND PHYSICAL  ANTEPARTUM          Subjective:       IAIN GERONIMO is a 23 y.o.  female with IUP at 38w6d measured by LMP with significant hx of GBS who is being admitted for active labor.   Patient reports contractions, reports vaginal bleeding, reports LOF.   Fetal Movement: normal.     PMHx: History reviewed. No pertinent past medical history.    PSHx: History reviewed. No pertinent surgical history.    All: Review of patient's allergies indicates:  No Known Allergies    Meds:   Medications Prior to Admission   Medication Sig Dispense Refill Last Dose    PRENATAL 28 mg iron- 800 mcg Tab Take 1 tablet by mouth once daily. 90 tablet 3 2019 at Unknown time       SH:   Social History     Socioeconomic History    Marital status: Single     Spouse name: Not on file    Number of children: Not on file    Years of education: Not on file    Highest education level: Not on file   Occupational History    Not on file   Social Needs    Financial resource strain: Not on file    Food insecurity:     Worry: Not on file     Inability: Not on file    Transportation needs:     Medical: Not on file     Non-medical: Not on file   Tobacco Use    Smoking status: Never Smoker    Smokeless tobacco: Never Used   Substance and Sexual Activity    Alcohol use: No     Frequency: Never    Drug use: Never    Sexual activity: Yes   Lifestyle    Physical activity:     Days per week: Not on file     Minutes per session: Not on file    Stress: Not on file   Relationships    Social connections:     Talks on phone: Not on file     Gets together: Not on file     Attends [employer] witness service: Not on file     Active member of club or organization: Not on file     Attends meetings of clubs or organizations: Not on file     Relationship status: Not on file   Other Topics Concern    Not on file   Social History Narrative    Not on file       FH:   Family History   Problem Relation Age of Onset    Hypertension Maternal  "Grandmother     Ovarian cancer Sister        OBHx:   OB History    Para Term  AB Living   2 1 1 0 0 1   SAB TAB Ectopic Multiple Live Births   0 0 0 0 1      # Outcome Date GA Lbr Sharif/2nd Weight Sex Delivery Anes PTL Lv   2 Current            1 Term 2012 40w0d   M Vag-Spont None  KIKI       Objective:       /75   Pulse 90   Temp 98.1 °F (36.7 °C)   Resp 18   Ht 5' 2" (1.575 m)   Wt 81.6 kg (180 lb)   LMP 2018 (Exact Date)   SpO2 100%   Breastfeeding? No   BMI 32.92 kg/m²     Vitals:    19 2230 19 2240 19 2244 19 2300   BP: 117/65   119/75   Pulse: 86 94  90   Resp:    18   Temp:   98.1 °F (36.7 °C)    TempSrc:       SpO2:  100%     Weight:       Height:           General:   alert, appears stated age and cooperative   Lungs:   clear to auscultation bilaterally   Heart:   regular rate and rhythm, S1, S2 normal, no murmur, click, rub or gallop   Abdomen:  soft, non-tender; bowel sounds normal; no masses,  no organomegaly   Extremities negative edema, negative erythema   FHT: 150 Cat 1 (reassuring)                 TOCO: Q 6 minutes   Presentations: cephalic by U/s   Cervix:     Dilation: 5 cm    Effacement: 75%    Station:  -2    Consistency: soft    Position: anterior     Lab Review  Blood Type O POS  GBBS: positive  Rubella: Immune  RPR: Nonreactive  HIV: negative  HepB: negative      Assessment:       38w6d weeks gestation presents for active labor  GBS +   Patient Active Problem List   Diagnosis    Abdominal pain affecting pregnancy    Vaginal discharge    Abdominal cramping    Labor without complication          Plan:      Risks, benefits, alternatives and possible complications have been discussed in detail with the patient.   - Consents signed and to chart  - Admit to  [facility]        Case Discussed with MD Milady Larsen MD   [facility]   PGY-1     "

## 2019-05-29 NOTE — ANESTHESIA POSTPROCEDURE EVALUATION
Anesthesia Post Evaluation    Patient: IAIN GERONIMO    Procedure(s) Performed: Procedure(s) (LRB):  LIGATION, FALLOPIAN TUBE, POSTPARTUM (N/A)    Final Anesthesia Type: general  Patient location during evaluation: PACU  Patient participation: Yes- Able to Participate  Level of consciousness: awake and alert, oriented and awake  Post-procedure vital signs: reviewed and stable  Pain management: adequate  Airway patency: patent  PONV status at discharge: No PONV  Anesthetic complications: no      Cardiovascular status: blood pressure returned to baseline  Respiratory status: unassisted and room air  Hydration status: euvolemic  Follow-up not needed.          Vitals Value Taken Time   /61 5/29/2019  9:12 AM   Temp 36.7 °C (98.1 °F) 5/29/2019  8:15 AM   Pulse 74 5/29/2019  9:15 AM   Resp 33 5/29/2019  9:15 AM   SpO2 100 % 5/29/2019  9:15 AM   Vitals shown include unvalidated device data.      No case tracking events are documented in the log.      Pain/Zenia Score: Pain Rating Prior to Med Admin: 4 (5/29/2019  8:55 AM)  Pain Rating Post Med Admin: 0 (5/29/2019  3:15 AM)

## 2019-05-29 NOTE — ANESTHESIA PREPROCEDURE EVALUATION
05/28/2019  IAIN GERONIMO is a 23 y.o., female for EMMY dx SIUP@TERM    Review of patient's allergies indicates:  No Known Allergies    History reviewed. No pertinent past medical history.  History reviewed. No pertinent surgical history.  Patient Active Problem List   Diagnosis    Abdominal pain affecting pregnancy    Vaginal discharge    Abdominal cramping    Labor without complication     Wt Readings from Last 3 Encounters:   05/26/19 80.7 kg (178 lb)   05/09/19 80.6 kg (177 lb 11.1 oz)   04/17/19 77.4 kg (170 lb 10.2 oz)     Temp Readings from Last 3 Encounters:   05/26/19 37.1 °C (98.7 °F) (Oral)   05/16/19 36.7 °C (98 °F) (Oral)   03/30/19 36.7 °C (98 °F) (Oral)     BP Readings from Last 3 Encounters:   05/26/19 123/67   05/17/19 (!) 104/52   05/16/19 105/65     Pulse Readings from Last 3 Encounters:   05/26/19 92   05/17/19 86   05/16/19 92         Anesthesia Evaluation    I have reviewed the Patient Summary Reports.         Review of Systems    Lab Results   Component Value Date    WBC 11.11 05/28/2019    HGB 12.0 05/28/2019    HCT 36.8 (L) 05/28/2019    MCV 84 05/28/2019     05/28/2019            Anesthesia Plan  Type of Anesthesia, risks & benefits discussed:  Anesthesia Type:  CSE  Patient's Preference:   Intra-op Monitoring Plan:   Intra-op Monitoring Plan Comments:   Post Op Pain Control Plan:   Post Op Pain Control Plan Comments:   Induction:    Beta Blocker:         Informed Consent: Patient understands risks and agrees with Anesthesia plan.  Questions answered. Anesthesia consent signed with patient representative.  ASA Score: 2     Day of Surgery Review of History & Physical: I have interviewed and examined the patient. I have reviewed the patient's H&P dated:  There are no significant changes.  H&P update referred to the provider.  H&P completed by Anesthesiologist.       Ready  For Surgery From Anesthesia Perspective.

## 2019-05-29 NOTE — ANESTHESIA POSTPROCEDURE EVALUATION
Anesthesia Post Evaluation    Patient: IAIN GERONIMO    Procedure(s) Performed: CSE    Final Anesthesia Type: CSE  Patient location during evaluation: labor & delivery  Patient participation: Yes- Able to Participate  Level of consciousness: awake and alert and oriented  Post-procedure vital signs: reviewed and stable  Pain management: adequate  Airway patency: patent  PONV status at discharge: No PONV  Anesthetic complications: no      Cardiovascular status: hemodynamically stable  Respiratory status: unassisted, room air and spontaneous ventilation  Hydration status: euvolemic            Vitals Value Taken Time   /72 5/29/2019  2:00 AM   Temp 36.8 °C (98.3 °F) 5/29/2019  2:00 AM   Pulse 88 5/29/2019  2:00 AM   Resp 18 5/29/2019  2:00 AM   SpO2 84 % 5/28/2019 11:29 PM   Vitals shown include unvalidated device data.      No case tracking events are documented in the log.      Pain/Zenia Score: Pain Rating Prior to Med Admin: 4 (5/29/2019  2:16 AM)  Pain Rating Post Med Admin: 0 (5/29/2019  3:15 AM)    Epidural catheter remains in back as patient is scheduled for bilateral tubal ligation this AM. Not connected to epidural infusion  No headache/neckache/backache  Heart catheter remains in place  Will reassess

## 2019-05-29 NOTE — PROGRESS NOTES
IAIN GERONIMO is a 23 y.o. female now  s/p  at 38w5d complicated by GPS appropriately treated with Penicillin G. She is post partum day 1 and post op day 1 for laparoscopic tubal ligation doing well. She reports moderate lochia, about the same as a period, abdominal tenderness and mild cramping. She is tolerating a regular diet, passing flatus and ambulating the halls and pain is moderate and controlled with dilaudid. She is currently bottle feeding. She denies chest pain, shortness of breath, nausea, vomiting, fevers or chills.     PE:  Vitals:    19 0900 19 0905 19 0910 19 0939   BP: 115/65 108/62 112/61 111/73   BP Location:    Left arm   Patient Position:    Lying   Pulse: 70 74 72 83   Resp: 16 18 18 18   Temp:    97.9 °F (36.6 °C)   TempSrc:    Oral   SpO2: 100% 100% 99% 100%   Weight:       Height:       I/O last 3 completed shifts:  In: -   Out: 750 [Urine:500; Blood:250]  I/O this shift:  In: -   Out: 800 [Urine:800]      General:   alert, appears stated age and cooperative   Lungs:   clear to auscultation bilaterally   Heart:   regular rate and rhythm, S1, S2 normal, no murmur, click, rub or gallop   Abdomen:  soft, non-tender; bowel sounds normal; no masses,  no organomegaly and fundus is firm and below the umbilicus   Uterine Size:  firm located at the umblicus.    Pelvic: VAGINA POSTPARTUM: normal, well-healed, physiologic discharge, without lesions and lochia moderate   Extremities: peripheral pulses normal, no pedal edema, no clubbing or cyanosis     Assessment:  23 y.o.female  38w5d HD#2 for  of a viable female infant doing well.     Patient Active Problem List   Diagnosis    Abdominal pain affecting pregnancy    Vaginal discharge    Abdominal cramping    Labor without complication     (spontaneous vaginal delivery)    S/P tubal ligation       PLAN:  Continue routine post-partum care   Pain controlled with percocet and dilaudid   Lochia is moderate    Fundus is firm and below umbilicus   Encourage breastfeeding and rooming-in   Encourage increased ambulation   Will likely be discharged tomorrow morning if hospital course remains uncomplicated       Case discussed with MD Milady Larsen MD   [facility]  PGY-1

## 2019-05-30 ENCOUNTER — TELEPHONE (OUTPATIENT)
Dept: OBSTETRICS AND GYNECOLOGY | Facility: CLINIC | Age: 24
End: 2019-05-30

## 2019-05-30 VITALS
DIASTOLIC BLOOD PRESSURE: 73 MMHG | TEMPERATURE: 98 F | SYSTOLIC BLOOD PRESSURE: 129 MMHG | HEART RATE: 105 BPM | RESPIRATION RATE: 18 BRPM | BODY MASS INDEX: 33.13 KG/M2 | OXYGEN SATURATION: 100 % | WEIGHT: 180 LBS | HEIGHT: 62 IN

## 2019-05-30 PROCEDURE — 25000003 PHARM REV CODE 250: Performed by: OBSTETRICS & GYNECOLOGY

## 2019-05-30 PROCEDURE — 94761 N-INVAS EAR/PLS OXIMETRY MLT: CPT

## 2019-05-30 RX ORDER — HYDROCODONE BITARTRATE AND ACETAMINOPHEN 5; 325 MG/1; MG/1
1 TABLET ORAL EVERY 4 HOURS PRN
Qty: 15 TABLET | Refills: 0 | Status: SHIPPED | OUTPATIENT
Start: 2019-05-30 | End: 2019-06-12

## 2019-05-30 RX ORDER — DOCUSATE SODIUM 100 MG/1
200 CAPSULE, LIQUID FILLED ORAL 2 TIMES DAILY PRN
Refills: 0 | COMMUNITY
Start: 2019-05-30

## 2019-05-30 RX ADMIN — IBUPROFEN 600 MG: 600 TABLET ORAL at 10:05

## 2019-05-30 RX ADMIN — IBUPROFEN 600 MG: 600 TABLET ORAL at 01:05

## 2019-05-30 RX ADMIN — HYDROCODONE BITARTRATE AND ACETAMINOPHEN 1 TABLET: 10; 325 TABLET ORAL at 10:05

## 2019-05-30 NOTE — TELEPHONE ENCOUNTER
----- Message from Quirino Marin MD sent at 5/29/2019  5:10 PM CDT -----  Please add patient to my schedule for pp visit/incision check on June 12 at 1pm    dudley marin MD

## 2019-05-30 NOTE — DISCHARGE INSTRUCTIONS
"Patient Discharge Instructions for Postpartum Women    Resume Regular Diet  Increase activity gradually, no heavy lifting  Shower  No tampons, douching or sexual intercourse.  Discuss birth control options with your physician.  Wear a support bra  Return to work/school when you've been cleared by a physician    Call your physician if     *Fever of 100.4 or higher  *Persistent nausea/ vomiting  *Incisional drainage  *Heavy vaginal bleeding or large clots (Heavy bleeding is soaking 1 pad in an hour)  *Swelling and pain in arms or legs  *Severe headaches, blurred vision or fainting  *Shortness of breath  *Frequency and burning with urination  *Signs of postpartum depression, discuss these signs with your physician    Call lactation services for questions regarding feeding, nipple and breast care, and general questions about lactation.  They can be reached at [phone]         Understanding Postpartum Depression    You've just had a baby.  You know you should be excited and happy.  But instead you find yourself crying for no reason.  You may have trouble coping with your daily tasks.  You feel sad, tired, and hopeless most of the time.  You may even feel ashamed or guilty.  But what you're going through is not your fault and you can feel better.  Talk to your doctor.  He or she can help.    Depression After Childbirth    You may be weepy and tired right after giving birth.  These feelings are normal.  They're sometimes called the "baby blues."  These blues go away 2-3 weeks.  However, postpartum (meaning "after birth") depression lasts much longer and is more sever than the "baby blues."  It can make you feel sad and hopeless.  You may also fear that your baby will be harmed and worry about being a bad mother.      What is Depression?    Depression is a mood disorder that affects the way you think and feel.  The most common symptom is a feeling of deep sadness.  You may also feel as if you just can't cope with life.  "   Other symptoms include:      * Gaining or loosing weight  * Sleeping too much or too little  * Feeling tired all the time  * Feeling restless  * Fears of harming your baby   * Lack of interest in your baby  * Feeling worthless or guilty  * No longer finding pleasure in things you used to  * Having trouble thinking clearly or making decisions  * Thoughts of hurting yourself or your baby    What Causes Postpartum Depression    The exact causes of postpartum depression isn't known.  It may be due to changes in your hormones during and after childbirth.  You may also be tired from caring for your baby and adjusting to being a mother.  All these factors may make you feel depressed.  In some cases, your genes may also play a role.    Depression Can Be Treated    The good news is that there are many ways to treat postpartum depression.  Talking to your doctor is the first step toward feeling better.    Resources:    * National [place] of Mental Health  -- 276.630.1778    www.nimh.nih.gov    * National [place] on Mental Illness --860.937.3789    Www.[place].org    * [facility] [place] -- 682.793.5704     Www.[employer].org    * National Suicide Hotline --342.158.9279 (800-SUICIDE)    6792-7469 [employer]  All rights reserved.  This information is not intended as a substitute for professional medical care.  Always follow up with your healthcare professional's instructions.

## 2019-05-30 NOTE — PLAN OF CARE
1300 Reviewed discharge documentation and medications with patient. Pt verbalized f/u appt is to be scheduled w/ OB. Pt is bonding with baby. Smiles appropriately at baby. Family support noted at bedside.  Mother shows she is able to care for herself and baby. Patient reports having help at home. Pt transported via wheelchair with baby in arms for discharge.

## 2019-05-30 NOTE — DISCHARGE SUMMARY
Discharge Summary      Admit Date: 2019    Discharge Date and Time: 2019    Attending Physician: Byron Dove MD     Discharge Physician: Milady Lewis    Principal Diagnoses:  (spontaneous vaginal delivery)  The primary encounter diagnosis was  (normal spontaneous vaginal delivery). Diagnoses of Labor without complication, S/P tubal ligation, and  (spontaneous vaginal delivery) were also pertinent to this visit.    Discharged Condition: good    Hospital Course: IAIN GERONIMO is a 23 y.o. female with pmh  has no past medical history on file. who presented with  (spontaneous vaginal delivery).       Pt is a 23 y.o. yo  PPD 2 from vaginal delivery. She was admitted on 2019 for active labor. She was managed accordingly. She delivered a female infant weighing 9 lbs 1 oz.  She is status post bilateral tubal ligation by Dr. Marin, doing well, pain is controlled with percocet and tylenol. By PPD 1, she was voiding without difficulty, ambulating well, tolerating a diet, and passing flatus. Her pain is well controlled. H/H is 12.0/36.4. She was desiring discharge home. She was given discharge teaching and instructions prior to leaving the hospital.  Pt voiced understanding of all instructions.       Physical Exam   Constitutional: She is oriented to person, place, and time and well-developed, well-nourished, and in no distress. No distress.   HENT:   Head: Normocephalic and atraumatic.   Eyes: Pupils are equal, round, and reactive to light. Conjunctivae are normal.   Neck: Normal range of motion. Neck supple. No thyromegaly present.   Cardiovascular: Normal rate, regular rhythm, normal heart sounds and intact distal pulses.   No murmur heard.  Pulmonary/Chest: Effort normal and breath sounds normal. No respiratory distress. She has no wheezes.   Abdominal: Soft. She exhibits no distension and no mass. There is tenderness. There is no rebound and no guarding.   Fundus is firm,  below umbilicus   Abdomen is appropriately tender   Genitourinary: Vagina normal.   Genitourinary Comments: Minimal lochia  External hemorrhoid   Musculoskeletal: Normal range of motion. She exhibits no edema or tenderness.   Lymphadenopathy:     She has no cervical adenopathy.   Neurological: She is alert and oriented to person, place, and time. She has normal reflexes. No cranial nerve deficit.   Skin: Skin is warm and dry. No rash noted. She is not diaphoretic. No erythema.   Psychiatric: Affect and judgment normal.   Nursing note and vitals reviewed.      Consults: None      Disposition: Home or Self Care    Patient Instructions:   Current Discharge Medication List      START taking these medications    Details   benzocaine-lanolin (DERMOPLAST) 20-0.5 % Aero Apply topically continuous prn.      docusate sodium (COLACE) 100 MG capsule Take 2 capsules (200 mg total) by mouth 2 (two) times daily as needed for Constipation.  Refills: 0      HYDROcodone-acetaminophen (NORCO) 5-325 mg per tablet Take 1 tablet by mouth every 4 (four) hours as needed.  Qty: 15 tablet, Refills: 0      lanolin Crea cream Apply topically as needed for Dry Skin (to nipples).  Refills: 0         CONTINUE these medications which have NOT CHANGED    Details   PRENATAL 28 mg iron- 800 mcg Tab Take 1 tablet by mouth once daily.  Qty: 90 tablet, Refills: 3    Associated Diagnoses: 32 weeks gestation of pregnancy               Future Appointments   Date Time Provider Department Center   6/7/2019 11:00 AM [facility]   6/12/2019  1:00 PM Quirino Marin MD [facility]       Case Discussed with MD Milady Larsen  \[facility]\"" [facility]   PGY-1   05/30/2019  12:05 PM

## 2019-05-30 NOTE — PROGRESS NOTES
OBGYN Note    S: patient doing well. No pain. Min bleeding. Pain improved now. Tolerating PO.    O  Temp:  [97.9 °F (36.6 °C)-98.5 °F (36.9 °C)]   Pulse:  [60-94]   Resp:  [14-20]   BP: (108-130)/(56-75)   SpO2:  [91 %-100 %]    Gen: A&Ox3, nAD  Abd: soft, fundus firm above the umbilicus, nontender infraumbilical incision c/d/i      Lab Results   Component Value Date    WBC 14.74 (H) 2019    HGB 12.0 2019    HCT 36.4 (L) 2019    MCV 84 2019     2019     O POS    AP: 24 yo now  female s/p uncomplicated , s/p uncomplicated pp BTL  BTL forms signed and in patient's chart  Will f/u with me for incision check in 2 wks (already scheduled - patient aware)    Will continue to follow with Family practice group    dudley jiang MD

## 2019-06-07 ENCOUNTER — OFFICE VISIT (OUTPATIENT)
Dept: FAMILY MEDICINE | Facility: HOSPITAL | Age: 24
End: 2019-06-07
Attending: FAMILY MEDICINE
Payer: MEDICAID

## 2019-06-07 VITALS
SYSTOLIC BLOOD PRESSURE: 112 MMHG | HEIGHT: 62 IN | DIASTOLIC BLOOD PRESSURE: 68 MMHG | HEART RATE: 76 BPM | WEIGHT: 162.5 LBS | BODY MASS INDEX: 29.9 KG/M2

## 2019-06-07 DIAGNOSIS — K64.9 HEMORRHOIDS, UNSPECIFIED HEMORRHOID TYPE: ICD-10-CM

## 2019-06-07 PROCEDURE — 99213 OFFICE O/P EST LOW 20 MIN: CPT | Performed by: STUDENT IN AN ORGANIZED HEALTH CARE EDUCATION/TRAINING PROGRAM

## 2019-06-07 RX ORDER — PRENATAL VIT NO.126/IRON/FOLIC 28MG-0.8MG
TABLET ORAL
Refills: 3 | COMMUNITY
Start: 2019-05-30 | End: 2019-06-07 | Stop reason: SDUPTHER

## 2019-06-07 NOTE — PROGRESS NOTES
Subjective:       Patient ID: Micheal Persaud is a 23 y.o. female.    Chief Complaint: Follow-up delivery    24 yo  presents for routine post vaginal delivery care s/p BTL. Doing well, no concerns. She denies any depressed mood or sadness. She has a lot of help with baby with mom and father of baby and family. She is bottle feeding baby, and only complaint is breast tenderness. Recommend binding and ice packs. She also complains of hemorrhoid that is painful. Lochia is minimal and lessening daily. Overall doing well.     Review of Systems   Constitutional: Negative for activity change, appetite change, fatigue and unexpected weight change.   HENT: Negative for congestion, hearing loss, postnasal drip, rhinorrhea and sinus pain.    Eyes: Negative for photophobia and visual disturbance.   Respiratory: Negative for apnea, cough, choking, chest tightness, shortness of breath and wheezing.    Cardiovascular: Negative for chest pain, palpitations and leg swelling.   Gastrointestinal: Negative for abdominal distention, abdominal pain, constipation, diarrhea, nausea and vomiting.   Endocrine: Negative for cold intolerance, heat intolerance and polyuria.   Genitourinary: Negative for difficulty urinating, flank pain, frequency and urgency.   Musculoskeletal: Negative for arthralgias, back pain and myalgias.   Skin: Negative for rash.   Neurological: Negative for dizziness, weakness, light-headedness, numbness and headaches.       Objective:      Vitals:    19 1128   BP: 112/68   Pulse: 76     Physical Exam   Constitutional: She is oriented to person, place, and time. She appears well-developed and well-nourished. No distress.   HENT:   Head: Normocephalic and atraumatic.   Mouth/Throat: Oropharynx is clear and moist. No oropharyngeal exudate.   Eyes: Pupils are equal, round, and reactive to light. Conjunctivae and EOM are normal. No scleral icterus.   Neck: Normal range of motion. Neck supple. No thyromegaly  present.   Cardiovascular: Normal rate, regular rhythm, normal heart sounds and intact distal pulses. Exam reveals no gallop and no friction rub.   No murmur heard.  Pulmonary/Chest: Effort normal and breath sounds normal. No respiratory distress. She has no wheezes. She exhibits no tenderness.   Abdominal: Soft. Bowel sounds are normal. She exhibits no distension and no mass. There is no tenderness. No hernia.   Surgical scar midline umbilicus healing well.    Genitourinary: Vagina normal and uterus normal.   Genitourinary Comments: External hemorrhoid, non incarcerated.    Musculoskeletal: Normal range of motion. She exhibits no edema, tenderness or deformity.   Lymphadenopathy:     She has no cervical adenopathy.   Neurological: She is alert and oriented to person, place, and time. No cranial nerve deficit.   Skin: Skin is warm and dry. Capillary refill takes less than 2 seconds. She is not diaphoretic. No erythema.   Psychiatric: She has a normal mood and affect. Her behavior is normal.   Nursing note and vitals reviewed.      Assessment:       1. Routine postpartum follow-up    2. Hemorrhoids, unspecified hemorrhoid type        Plan:       Routine postpartum follow-up  - Continue routine care   - Fu with dr. Marin for post-op   Hemorrhoids, unspecified hemorrhoid type  - Apply Witch hazel   - Continue stool softener  - Continue routine care     Follow up in about 5 weeks (around 7/12/2019).

## 2019-06-12 ENCOUNTER — POSTPARTUM VISIT (OUTPATIENT)
Dept: OBSTETRICS AND GYNECOLOGY | Facility: CLINIC | Age: 24
End: 2019-06-12
Payer: MEDICAID

## 2019-06-12 VITALS
DIASTOLIC BLOOD PRESSURE: 60 MMHG | BODY MASS INDEX: 29.19 KG/M2 | WEIGHT: 159.63 LBS | SYSTOLIC BLOOD PRESSURE: 102 MMHG

## 2019-06-12 DIAGNOSIS — G89.18 POSTOPERATIVE PAIN: ICD-10-CM

## 2019-06-12 DIAGNOSIS — Z09 POSTOP CHECK: Primary | ICD-10-CM

## 2019-06-12 PROCEDURE — 99999 PR PBB SHADOW E&M-EST. PATIENT-LVL III: CPT | Mod: PBBFAC,,, | Performed by: OBSTETRICS & GYNECOLOGY

## 2019-06-12 PROCEDURE — 99999 PR PBB SHADOW E&M-EST. PATIENT-LVL III: ICD-10-PCS | Mod: PBBFAC,,, | Performed by: OBSTETRICS & GYNECOLOGY

## 2019-06-12 PROCEDURE — 99024 PR POST-OP FOLLOW-UP VISIT: ICD-10-PCS | Mod: ,,, | Performed by: OBSTETRICS & GYNECOLOGY

## 2019-06-12 PROCEDURE — 99024 POSTOP FOLLOW-UP VISIT: CPT | Mod: ,,, | Performed by: OBSTETRICS & GYNECOLOGY

## 2019-06-12 PROCEDURE — 99213 OFFICE O/P EST LOW 20 MIN: CPT | Mod: PBBFAC,PO | Performed by: OBSTETRICS & GYNECOLOGY

## 2019-06-12 RX ORDER — IBUPROFEN 800 MG/1
800 TABLET ORAL EVERY 8 HOURS PRN
Qty: 30 TABLET | Refills: 2 | Status: SHIPPED | OUTPATIENT
Start: 2019-06-12 | End: 2020-06-11

## 2019-06-12 NOTE — PROGRESS NOTES
Postop     Patient presents for postoperative visit today. She is s/p PP BTL on May 29, 2019. Patient delivered with [facility] and then desired immediate PP BTL. She presents for postop visit. Doing well. No fever. No chills. Incision without problems. Would like pain meds for prn pain.    ROS: per HPI     PE:   Vitals: /60   Wt 72.4 kg (159 lb 9.8 oz)   LMP 08/31/2018 (Exact Date)   Breastfeeding? Yes   BMI 29.19 kg/m²   APPEARANCE: Well nourished, well developed, in no acute distress.  ABDOMEN: Soft. No tenderness or masses. Infraumbilical incision c/d/i  PELVIC:deferred    A/P:   1) Postop:  -patient healing well from procedure/surgery  -continue pain meds as needed  - rx for motrin sent  -benign pathology discussed with the patient - copy of pathology results discussed with the patient     2) Contraception: s/p BTL    KRISSY Marin MD